# Patient Record
Sex: MALE | Race: WHITE | NOT HISPANIC OR LATINO | Employment: FULL TIME | URBAN - METROPOLITAN AREA
[De-identification: names, ages, dates, MRNs, and addresses within clinical notes are randomized per-mention and may not be internally consistent; named-entity substitution may affect disease eponyms.]

---

## 2017-03-23 ENCOUNTER — TRANSFERRED RECORDS (OUTPATIENT)
Dept: HEALTH INFORMATION MANAGEMENT | Facility: CLINIC | Age: 62
End: 2017-03-23

## 2018-03-15 ENCOUNTER — TRANSFERRED RECORDS (OUTPATIENT)
Dept: HEALTH INFORMATION MANAGEMENT | Facility: CLINIC | Age: 63
End: 2018-03-15

## 2018-07-26 ENCOUNTER — TRANSFERRED RECORDS (OUTPATIENT)
Dept: HEALTH INFORMATION MANAGEMENT | Facility: CLINIC | Age: 63
End: 2018-07-26

## 2018-08-02 ENCOUNTER — TRANSFERRED RECORDS (OUTPATIENT)
Dept: HEALTH INFORMATION MANAGEMENT | Facility: CLINIC | Age: 63
End: 2018-08-02

## 2018-08-03 ENCOUNTER — TRANSFERRED RECORDS (OUTPATIENT)
Dept: HEALTH INFORMATION MANAGEMENT | Facility: CLINIC | Age: 63
End: 2018-08-03

## 2018-09-06 ENCOUNTER — TRANSFERRED RECORDS (OUTPATIENT)
Dept: HEALTH INFORMATION MANAGEMENT | Facility: CLINIC | Age: 63
End: 2018-09-06

## 2018-09-12 ENCOUNTER — TRANSFERRED RECORDS (OUTPATIENT)
Dept: HEALTH INFORMATION MANAGEMENT | Facility: CLINIC | Age: 63
End: 2018-09-12

## 2018-09-18 ENCOUNTER — TRANSFERRED RECORDS (OUTPATIENT)
Dept: HEALTH INFORMATION MANAGEMENT | Facility: CLINIC | Age: 63
End: 2018-09-18

## 2019-01-31 ENCOUNTER — TRANSFERRED RECORDS (OUTPATIENT)
Dept: HEALTH INFORMATION MANAGEMENT | Facility: CLINIC | Age: 64
End: 2019-01-31

## 2019-08-16 ENCOUNTER — OFFICE VISIT (OUTPATIENT)
Dept: FAMILY MEDICINE | Facility: CLINIC | Age: 64
End: 2019-08-16
Payer: COMMERCIAL

## 2019-08-16 VITALS
HEART RATE: 66 BPM | SYSTOLIC BLOOD PRESSURE: 132 MMHG | RESPIRATION RATE: 18 BRPM | BODY MASS INDEX: 24.23 KG/M2 | OXYGEN SATURATION: 98 % | HEIGHT: 67 IN | TEMPERATURE: 98.5 F | DIASTOLIC BLOOD PRESSURE: 80 MMHG | WEIGHT: 154.4 LBS

## 2019-08-16 DIAGNOSIS — N40.0 BENIGN PROSTATIC HYPERPLASIA WITHOUT LOWER URINARY TRACT SYMPTOMS: ICD-10-CM

## 2019-08-16 DIAGNOSIS — Z00.00 ENCOUNTER FOR PREVENTIVE HEALTH EXAMINATION: Primary | ICD-10-CM

## 2019-08-16 DIAGNOSIS — Z12.83 SKIN CANCER SCREENING: ICD-10-CM

## 2019-08-16 PROCEDURE — 99397 PER PM REEVAL EST PAT 65+ YR: CPT | Performed by: PHYSICIAN ASSISTANT

## 2019-08-16 RX ORDER — FINASTERIDE 5 MG/1
5 TABLET, FILM COATED ORAL DAILY
COMMUNITY
End: 2020-02-04

## 2019-08-16 RX ORDER — TAMSULOSIN HYDROCHLORIDE 0.4 MG/1
0.4 CAPSULE ORAL DAILY
COMMUNITY
End: 2020-02-04

## 2019-08-16 RX ORDER — TAMSULOSIN HYDROCHLORIDE 0.4 MG/1
0.4 CAPSULE ORAL DAILY
Qty: 90 CAPSULE | Refills: 3 | Status: SHIPPED | OUTPATIENT
Start: 2019-08-16 | End: 2020-08-12

## 2019-08-16 RX ORDER — FINASTERIDE 5 MG/1
5 TABLET, FILM COATED ORAL DAILY
Qty: 90 TABLET | Refills: 3 | Status: SHIPPED | OUTPATIENT
Start: 2019-08-16 | End: 2020-08-27

## 2019-08-16 ASSESSMENT — ACTIVITIES OF DAILY LIVING (ADL): CURRENT_FUNCTION: NO ASSISTANCE NEEDED

## 2019-08-16 ASSESSMENT — ENCOUNTER SYMPTOMS
SORE THROAT: 0
CHILLS: 0
DIARRHEA: 0
ABDOMINAL PAIN: 0
MYALGIAS: 0
JOINT SWELLING: 0
HEADACHES: 0
HEMATURIA: 0
NERVOUS/ANXIOUS: 0
HEMATOCHEZIA: 0
WEAKNESS: 0
NAUSEA: 0
SHORTNESS OF BREATH: 0
PARESTHESIAS: 0
PALPITATIONS: 0
ARTHRALGIAS: 0
DIZZINESS: 0
CONSTIPATION: 0
EYE PAIN: 0
HEARTBURN: 0
FEVER: 0
DYSURIA: 0
FREQUENCY: 0
COUGH: 0

## 2019-08-16 ASSESSMENT — MIFFLIN-ST. JEOR: SCORE: 1438.98

## 2019-08-16 NOTE — PATIENT INSTRUCTIONS
Please send over records from your outside health care facility      Patient Education   Personalized Prevention Plan  You are due for the preventive services outlined below.  Your care team is available to assist you in scheduling these services.  If you have already completed any of these items, please share that information with your care team to update in your medical record.  Health Maintenance Due   Topic Date Due     Hepatitis C Screening  1955     Discuss Advance Care Planning  1955     Colonscopy  06/26/1963     Diptheria Tetanus Pertussis (DTAP/TDAP/TD) Vaccine (1 - Tdap) 06/26/1978     Cholesterol Lab  06/26/1988     Zoster (Shingles) Vaccine (1 of 2) 06/26/2003     Annual Wellness Visit  06/26/2018     FALL RISK ASSESSMENT  06/26/2018     Pneumococcal Vaccine (1 of 2 - PCV13) 06/26/2018     AORTIC ANEURYSM SCREENING (SYSTEM ASSIGNED)  06/26/2018     PHQ-2  01/01/2019

## 2019-08-16 NOTE — PROGRESS NOTES
"SUBJECTIVE:   Guerrero Ibrahim is a 64 year old male who presents for Preventive Visit.  Are you in the first 12 months of your Medicare coverage?  No    Healthy Habits:     In general, how would you rate your overall health?  Good    Frequency of exercise:  2-3 days/week    Duration of exercise:  15-30 minutes    Do you usually eat at least 4 servings of fruit and vegetables a day, include whole grains    & fiber and avoid regularly eating high fat or \"junk\" foods?  Yes    Taking medications regularly:  Yes    Medication side effects:  Not applicable    Ability to successfully perform activities of daily living:  No assistance needed    Home Safety:  No safety concerns identified    Hearing Impairment:  No hearing concerns    In the past 6 months, have you been bothered by leaking of urine?  No    In general, how would you rate your overall mental or emotional health?  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 Patient has family in the area and has lived in the [facility] previously. Has two daughters, also two granddaughters. Patient has history of LISA on CPAP since 1993. Had episode of urinary retention 13 months ago requiring catheterization in the emergency department. Since that time has been on finasteride and flomax without issue.     PMH:  Sleep apnea on CPAP  BPH  Appendectomy    PFHx:   None    Social:  Non-smoker      Do you feel safe in your environment? Yes    Do you have a Health Care Directive? Yes: Patient states has Advance Directive and will bring in a copy to clinic.      Fall risk     click delete button to remove this line now  Cognitive Screening   1) Repeat 3 items (Leader, Season, Table)    2) Clock draw:   3) 3 item recall: Recalls 3 objects  Results: 3 items recalled: COGNITIVE IMPAIRMENT LESS LIKELY    Mini-CogTM Copyright S Eliud. Licensed by the author for use in " [facility]; reprinted with permission ([email or web address]@[facility]). All rights reserved.      Do you have sleep apnea, excessive snoring or daytime drowsiness?: yes    Reviewed and updated as needed this visit by clinical staff  Tobacco  Med Hx  Surg Hx  Fam Hx  Soc Hx        Reviewed and updated as needed this visit by Provider        Social History     Tobacco Use     Smoking status: Never Smoker     Smokeless tobacco: Never Used   Substance Use Topics     Alcohol use: Yes     If you drink alcohol do you typically have >3 drinks per day or >7 drinks per week? No    Alcohol Use 8/16/2019   Prescreen: >3 drinks/day or >7 drinks/week? No   Prescreen: >3 drinks/day or >7 drinks/week? -       No care team member to display    The following health maintenance items are reviewed in Epic and correct as of today:  Health Maintenance   Topic Date Due     HEPATITIS C SCREENING  1955     ADVANCE CARE PLANNING  1955     COLONOSCOPY  06/26/1963     DTAP/TDAP/TD IMMUNIZATION (1 - Tdap) 06/26/1978     LIPID  06/26/1988     ZOSTER IMMUNIZATION (1 of 2) 06/26/2003     MEDICARE ANNUAL WELLNESS VISIT  06/26/2018     FALL RISK ASSESSMENT  06/26/2018     PNEUMOCOCCAL IMMUNIZATION 65+ LOW/MEDIUM RISK (1 of 2 - PCV13) 06/26/2018     AORTIC ANEURYSM SCREENING (SYSTEM ASSIGNED)  06/26/2018     PHQ-2  01/01/2019     INFLUENZA VACCINE (1) 09/01/2019     IPV IMMUNIZATION  Aged Out     MENINGITIS IMMUNIZATION  Aged Out     BP Readings from Last 3 Encounters:   08/16/19 132/80    Wt Readings from Last 3 Encounters:   08/16/19 70 kg (154 lb 6.4 oz)                  There is no problem list on file for this patient.    History reviewed. No pertinent surgical history.    Social History     Tobacco Use     Smoking status: Never Smoker     Smokeless tobacco: Never Used   Substance Use Topics     Alcohol use: Yes     History reviewed. No pertinent family history.      Current Outpatient Medications   Medication Sig Dispense Refill      "finasteride (PROSCAR) 5 MG tablet Take 5 mg by mouth daily       finasteride (PROSCAR) 5 MG tablet Take 1 tablet (5 mg) by mouth daily 90 tablet 3     tamsulosin (FLOMAX) 0.4 MG capsule Take 0.4 mg by mouth daily       tamsulosin (FLOMAX) 0.4 MG capsule Take 1 capsule (0.4 mg) by mouth daily 90 capsule 3       Review of Systems   Constitutional: Negative for chills and fever.   HENT: Negative for congestion, ear pain, hearing loss and sore throat.    Eyes: Negative for pain and visual disturbance.   Respiratory: Negative for cough and shortness of breath.    Cardiovascular: Negative for chest pain, palpitations and peripheral edema.   Gastrointestinal: Negative for abdominal pain, constipation, diarrhea, heartburn, hematochezia and nausea.   Genitourinary: Negative for discharge, dysuria, frequency, genital sores, hematuria, impotence and urgency.   Musculoskeletal: Negative for arthralgias, joint swelling and myalgias.   Skin: Negative for rash.   Neurological: Negative for dizziness, weakness, headaches and paresthesias.   Psychiatric/Behavioral: Negative for mood changes. The patient is not nervous/anxious.        OBJECTIVE:   /80 (BP Location: Right arm, Patient Position: Sitting, Cuff Size: Adult Regular)   Pulse 66   Temp 98.5  F (36.9  C) (Oral)   Resp 18   Ht 1.702 m (5' 7\")   Wt 70 kg (154 lb 6.4 oz)   SpO2 98%   BMI 24.18 kg/m   Estimated body mass index is 24.18 kg/m  as calculated from the following:    Height as of this encounter: 1.702 m (5' 7\").    Weight as of this encounter: 70 kg (154 lb 6.4 oz).  Physical Exam  GENERAL: healthy, alert and no distress  EYES: Eyes grossly normal to inspection, PERRL and conjunctivae and sclerae normal  HENT: ear canals and TM's normal, nose and mouth without ulcers or lesions  NECK: no adenopathy, no asymmetry, masses, or scars and thyroid normal to palpation  RESP: lungs clear to auscultation - no rales, rhonchi or wheezes  CV: regular rate and rhythm, " "normal S1 S2, no S3 or S4, no murmur, click or rub, no peripheral edema and peripheral pulses strong  ABDOMEN: soft, nontender, no hepatosplenomegaly, no masses and bowel sounds normal  MS: no gross musculoskeletal defects noted, no edema  SKIN: no suspicious lesions or rashes  NEURO: Normal strength and tone, mentation intact and speech normal  PSYCH: mentation appears normal, affect normal/bright    Diagnostic Test Results:  none     ASSESSMENT / PLAN:   1. Encounter for preventive health examination  - Establishing care after move from [place]  - No new health concerns at this time  - Reports he is up to date on preventive screenings such as colonoscopy, will send records from outside healthcare organization for verification. Reports colonoscopy screening [id number], had one just prior to move which was clear per his report     2. Benign prostatic hyperplasia without lower urinary tract symptoms  - finasteride (PROSCAR) 5 MG tablet; Take 1 tablet (5 mg) by mouth daily  Dispense: 90 tablet; Refill: 3  - tamsulosin (FLOMAX) 0.4 MG capsule; Take 1 capsule (0.4 mg) by mouth daily  Dispense: 90 capsule; Refill: 3  - Symptoms well managed on current medications without adverse effects    3. Skin cancer screening  - Has history of sunburn with multiple episodes of blistering  - Would like to establish with dermatology provider  - DERMATOLOGY REFERRAL      COUNSELING:  Reviewed preventive health counseling, as reflected in patient instructions       Regular exercise       Healthy diet/nutrition       Bladder control       Colon cancer screening    Estimated body mass index is 24.18 kg/m  as calculated from the following:    Height as of this encounter: 1.702 m (5' 7\").    Weight as of this encounter: 70 kg (154 lb 6.4 oz).         reports that he has never smoked. He has never used smokeless tobacco.      Appropriate preventive services were discussed with this patient, including applicable screening as appropriate for " cardiovascular disease, diabetes, osteopenia/osteoporosis, and glaucoma.  As appropriate for age/gender, discussed screening for colorectal cancer, prostate cancer, breast cancer, and cervical cancer. Checklist reviewing preventive services available has been given to the patient.    Reviewed patients plan of care and provided an AVS. The Basic Care Plan (routine screening as documented in Health Maintenance) for Guerrero meets the Care Plan requirement. This Care Plan has been established and reviewed with the Patient.    Counseling Resources:  ATP IV Guidelines  Pooled Cohorts Equation Calculator  Breast Cancer Risk Calculator  FRAX Risk Assessment  ICSI Preventive Guidelines  Dietary Guidelines for Americans, 2010  USDA's MyPlate  ASA Prophylaxis  Lung CA Screening    Nagi Ayoub PA-C  [facility]    Identified Health Risks:

## 2019-08-22 ENCOUNTER — OFFICE VISIT (OUTPATIENT)
Dept: DERMATOLOGY | Facility: CLINIC | Age: 64
End: 2019-08-22
Attending: PHYSICIAN ASSISTANT
Payer: COMMERCIAL

## 2019-08-22 ENCOUNTER — DOCUMENTATION ONLY (OUTPATIENT)
Dept: CARE COORDINATION | Facility: CLINIC | Age: 64
End: 2019-08-22

## 2019-08-22 DIAGNOSIS — Z12.83 SKIN CANCER SCREENING: ICD-10-CM

## 2019-08-22 DIAGNOSIS — L57.8 DIFFUSE PHOTODAMAGE OF SKIN: ICD-10-CM

## 2019-08-22 DIAGNOSIS — L81.4 SOLAR LENTIGINOSIS: Primary | ICD-10-CM

## 2019-08-22 RX ORDER — CHOLECALCIFEROL (VITAMIN D3) 50 MCG
1 TABLET ORAL DAILY
COMMUNITY

## 2019-08-22 ASSESSMENT — PAIN SCALES - GENERAL: PAINLEVEL: NO PAIN (0)

## 2019-08-22 NOTE — LETTER
8/22/2019       RE: Guerrero Ibrahim  [address] [place] [address]     Dear Colleague,    Thank you for referring your patient, Guerrero Ibrahim, to the [facility] at [facility]. Please see a copy of my visit note below.    CHIEF COMPLAINT:  Full-body skin check.      HISTORY OF PRESENT ILLNESS:  Mr. 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  He has previously lived in the [place] several decades ago as well.  Today he presents for a full-body skin exam.  He has no personal history of skin cancer and no known family history of melanoma.  He denies any new or changing moles and has no obvious localized areas of itch, pain or bleeding.  His specific concerns are several tan spots on his back as well as a small crusted spot on his left forehead, which he thinks is from a scratch or bump in the last 1 week.      REVIEW OF SYSTEMS:  No recent fevers or chills.  No nonhealing oral sores.      PHYSICAL EXAMINATION:   GENERAL:  This is a well-appearing, well-nourished male with a normal mood and affect who is oriented x3.   SKIN:  A cutaneous exam of the head, neck, chest, abdomen, back, bilateral upper and lower extremities and buttocks was performed.  On the left forehead immediately above the lateral aspect of the eyebrow, there is a 2 mm crusted pink papule with erosion.  On the bilateral cheeks, there are scattered 1 mm light tan macules consistent with solar lentigines.  On the back, there are rare scattered 1-2 mm pigmented, flat-topped papules consistent with benign nevi.  On the cheeks, neck and forearms, there is moderate chronic photodamage and rhytids.  The rest of his skin exam demonstrates scattered, benign-appearing solar lentigines but no lesions concerning for melanoma or nonmelanoma skin cancer.       ASSESSMENT AND PLAN:   1.  Crusted erosion of the left forehead.  This appears to be traumatic in nature today.  No significant concerning features were identified on physical exam or dermoscopy.  Reassurance was provided, and gentle skin care was encouraged.   2.  Scattered solar lentigines and benign-appearing nevi.  Reassurance was provided as to the benign nature of these lesions.   3.  Mild to moderate chronic photodamage.  We had an extended discussion today about the importance of high SPF sunscreen and sun avoidance and the ABCDEs of melanoma as well as the presenting signs and symptoms of nonmelanoma skin cancers.  Reassurance was provided as to the benign nature of his exam today.   4.  He will follow up in our clinic in 1-2 years' time.       Rajinder Pulido MD  Dermatology Attending

## 2019-08-22 NOTE — NURSING NOTE
Dermatology Rooming Note    Guerrero Ibrahim's goals for this visit include:   Chief Complaint   Patient presents with     Skin Check     Skin check, no lesions of concern noted.     Roseanna Tellez LPN

## 2019-08-22 NOTE — PROGRESS NOTES
CHIEF COMPLAINT:  Full-body skin check.      HISTORY OF PRESENT ILLNESS:  Mr. 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  He has previously lived in the [place] several decades ago as well.  Today he presents for a full-body skin exam.  He has no personal history of skin cancer and no known family history of melanoma.  He denies any new or changing moles and has no obvious localized areas of itch, pain or bleeding.  His specific concerns are several tan spots on his back as well as a small crusted spot on his left forehead, which he thinks is from a scratch or bump in the last 1 week.      REVIEW OF SYSTEMS:  No recent fevers or chills.  No nonhealing oral sores.      PHYSICAL EXAMINATION:   GENERAL:  This is a well-appearing, well-nourished male with a normal mood and affect who is oriented x3.   SKIN:  A cutaneous exam of the head, neck, chest, abdomen, back, bilateral upper and lower extremities and buttocks was performed.  On the left forehead immediately above the lateral aspect of the eyebrow, there is a 2 mm crusted pink papule with erosion.  On the bilateral cheeks, there are scattered 1 mm light tan macules consistent with solar lentigines.  On the back, there are rare scattered 1-2 mm pigmented, flat-topped papules consistent with benign nevi.  On the cheeks, neck and forearms, there is moderate chronic photodamage and rhytids.  The rest of his skin exam demonstrates scattered, benign-appearing solar lentigines but no lesions concerning for melanoma or nonmelanoma skin cancer.      ASSESSMENT AND PLAN:   1.  Crusted erosion of the left forehead.  This appears to be traumatic in nature today.  No significant concerning features were identified on physical exam or dermoscopy.  Reassurance was provided, and gentle skin care was encouraged.   2.  Scattered  solar lentigines and benign-appearing nevi.  Reassurance was provided as to the benign nature of these lesions.   3.  Mild to moderate chronic photodamage.  We had an extended discussion today about the importance of high SPF sunscreen and sun avoidance and the ABCDEs of melanoma as well as the presenting signs and symptoms of nonmelanoma skin cancers.  Reassurance was provided as to the benign nature of his exam today.   4.  He will follow up in our clinic in 1-2 years' time.       Rajinder Pulido MD  Dermatology Attending

## 2019-08-28 ENCOUNTER — DOCUMENTATION ONLY (OUTPATIENT)
Dept: FAMILY MEDICINE | Facility: CLINIC | Age: 64
End: 2019-08-28

## 2019-08-28 NOTE — PROGRESS NOTES
Received outside records from [facility]. Placed in Nagi Ayoub's mailbox.

## 2019-09-14 PROBLEM — L57.8 DIFFUSE PHOTODAMAGE OF SKIN: Status: ACTIVE | Noted: 2019-09-14

## 2019-09-14 PROBLEM — L81.4 SOLAR LENTIGINOSIS: Status: ACTIVE | Noted: 2019-09-14

## 2019-09-14 PROBLEM — Z12.83 SKIN CANCER SCREENING: Status: ACTIVE | Noted: 2019-09-14

## 2019-10-31 ENCOUNTER — ANCILLARY PROCEDURE (OUTPATIENT)
Dept: GENERAL RADIOLOGY | Facility: CLINIC | Age: 64
End: 2019-10-31
Attending: PHYSICIAN ASSISTANT
Payer: COMMERCIAL

## 2019-10-31 ENCOUNTER — OFFICE VISIT (OUTPATIENT)
Dept: URGENT CARE | Facility: URGENT CARE | Age: 64
End: 2019-10-31
Payer: COMMERCIAL

## 2019-10-31 VITALS
TEMPERATURE: 97.5 F | HEART RATE: 71 BPM | RESPIRATION RATE: 20 BRPM | SYSTOLIC BLOOD PRESSURE: 188 MMHG | DIASTOLIC BLOOD PRESSURE: 87 MMHG | OXYGEN SATURATION: 99 %

## 2019-10-31 DIAGNOSIS — M54.6 ACUTE LEFT-SIDED THORACIC BACK PAIN: ICD-10-CM

## 2019-10-31 DIAGNOSIS — R07.9 ACUTE CHEST PAIN: Primary | ICD-10-CM

## 2019-10-31 DIAGNOSIS — R07.9 ACUTE CHEST PAIN: ICD-10-CM

## 2019-10-31 LAB
ALBUMIN SERPL-MCNC: 4.3 G/DL (ref 3.4–5)
ALP SERPL-CCNC: 85 U/L (ref 40–150)
ALT SERPL W P-5'-P-CCNC: 22 U/L (ref 0–70)
ANION GAP SERPL CALCULATED.3IONS-SCNC: 6 MMOL/L (ref 3–14)
AST SERPL W P-5'-P-CCNC: 11 U/L (ref 0–45)
BASOPHILS # BLD AUTO: 0 10E9/L (ref 0–0.2)
BASOPHILS NFR BLD AUTO: 0.6 %
BILIRUB SERPL-MCNC: 0.5 MG/DL (ref 0.2–1.3)
BUN SERPL-MCNC: 16 MG/DL (ref 7–30)
CALCIUM SERPL-MCNC: 8.8 MG/DL (ref 8.5–10.1)
CHLORIDE SERPL-SCNC: 104 MMOL/L (ref 94–109)
CO2 SERPL-SCNC: 28 MMOL/L (ref 20–32)
CREAT SERPL-MCNC: 0.91 MG/DL (ref 0.66–1.25)
D DIMER PPP FEU-MCNC: 0.3 UG/ML FEU (ref 0–0.5)
DIFFERENTIAL METHOD BLD: NORMAL
EOSINOPHIL # BLD AUTO: 0.3 10E9/L (ref 0–0.7)
EOSINOPHIL NFR BLD AUTO: 3.9 %
ERYTHROCYTE [DISTWIDTH] IN BLOOD BY AUTOMATED COUNT: 12.2 % (ref 10–15)
ERYTHROCYTE [SEDIMENTATION RATE] IN BLOOD BY WESTERGREN METHOD: 7 MM/H (ref 0–20)
GFR SERPL CREATININE-BSD FRML MDRD: 88 ML/MIN/{1.73_M2}
GLUCOSE SERPL-MCNC: 100 MG/DL (ref 70–99)
HCT VFR BLD AUTO: 40.6 % (ref 40–53)
HGB BLD-MCNC: 13.5 G/DL (ref 13.3–17.7)
LYMPHOCYTES # BLD AUTO: 1.7 10E9/L (ref 0.8–5.3)
LYMPHOCYTES NFR BLD AUTO: 25.2 %
MCH RBC QN AUTO: 30.1 PG (ref 26.5–33)
MCHC RBC AUTO-ENTMCNC: 33.3 G/DL (ref 31.5–36.5)
MCV RBC AUTO: 91 FL (ref 78–100)
MONOCYTES # BLD AUTO: 0.6 10E9/L (ref 0–1.3)
MONOCYTES NFR BLD AUTO: 8.5 %
NEUTROPHILS # BLD AUTO: 4.1 10E9/L (ref 1.6–8.3)
NEUTROPHILS NFR BLD AUTO: 61.8 %
PLATELET # BLD AUTO: 189 10E9/L (ref 150–450)
POTASSIUM SERPL-SCNC: 4.2 MMOL/L (ref 3.4–5.3)
PROT SERPL-MCNC: 7.6 G/DL (ref 6.8–8.8)
RBC # BLD AUTO: 4.48 10E12/L (ref 4.4–5.9)
SODIUM SERPL-SCNC: 138 MMOL/L (ref 133–144)
TROPONIN I SERPL-MCNC: <0.015 UG/L (ref 0–0.04)
WBC # BLD AUTO: 6.6 10E9/L (ref 4–11)

## 2019-10-31 PROCEDURE — 85652 RBC SED RATE AUTOMATED: CPT | Performed by: PHYSICIAN ASSISTANT

## 2019-10-31 PROCEDURE — 80053 COMPREHEN METABOLIC PANEL: CPT | Performed by: PHYSICIAN ASSISTANT

## 2019-10-31 PROCEDURE — 93000 ELECTROCARDIOGRAM COMPLETE: CPT | Performed by: PHYSICIAN ASSISTANT

## 2019-10-31 PROCEDURE — 99215 OFFICE O/P EST HI 40 MIN: CPT | Mod: 25 | Performed by: PHYSICIAN ASSISTANT

## 2019-10-31 PROCEDURE — 84484 ASSAY OF TROPONIN QUANT: CPT | Performed by: PHYSICIAN ASSISTANT

## 2019-10-31 PROCEDURE — 71046 X-RAY EXAM CHEST 2 VIEWS: CPT

## 2019-10-31 PROCEDURE — 85379 FIBRIN DEGRADATION QUANT: CPT | Performed by: PHYSICIAN ASSISTANT

## 2019-10-31 PROCEDURE — 85025 COMPLETE CBC W/AUTO DIFF WBC: CPT | Performed by: PHYSICIAN ASSISTANT

## 2019-10-31 PROCEDURE — 36415 COLL VENOUS BLD VENIPUNCTURE: CPT | Performed by: PHYSICIAN ASSISTANT

## 2019-10-31 PROCEDURE — 96372 THER/PROPH/DIAG INJ SC/IM: CPT | Performed by: PHYSICIAN ASSISTANT

## 2019-10-31 RX ORDER — KETOROLAC TROMETHAMINE 30 MG/ML
30 INJECTION, SOLUTION INTRAMUSCULAR; INTRAVENOUS ONCE
Status: COMPLETED | OUTPATIENT
Start: 2019-10-31 | End: 2019-10-31

## 2019-10-31 RX ORDER — METHYLPREDNISOLONE 4 MG
TABLET, DOSE PACK ORAL
Qty: 21 TABLET | Refills: 0 | Status: SHIPPED | OUTPATIENT
Start: 2019-10-31 | End: 2020-02-04

## 2019-10-31 RX ORDER — METHOCARBAMOL 750 MG/1
750 TABLET, FILM COATED ORAL 4 TIMES DAILY PRN
Qty: 30 TABLET | Refills: 0 | Status: SHIPPED | OUTPATIENT
Start: 2019-10-31 | End: 2020-02-04

## 2019-10-31 RX ADMIN — KETOROLAC TROMETHAMINE 30 MG: 30 INJECTION, SOLUTION INTRAMUSCULAR; INTRAVENOUS at 11:27

## 2019-11-01 NOTE — PROGRESS NOTES
SUBJECTIVE:   Guerrero Ibrahim is a 64 year old male presenting with a chief complaint of having left sided back pain that wraps around to the left side chest area.  Onset of symptoms was 2 day(s) ago.  Course of illness is same.    Severity moderate  Current and Associated symptoms: pain in upper left back area  Treatment measures tried include OTC medications.  Predisposing factors include none.    PMH  Allergies     Allergies   Allergen Reactions     Percocet [Oxycodone-Acetaminophen] Nausea and Vomiting     Family Hx  Allergies  HTN    Social History     Tobacco Use     Smoking status: Never Smoker     Smokeless tobacco: Never Used   Substance Use Topics     Alcohol use: Yes       ROS:  CONSTITUTIONAL:NEGATIVE for fever, chills, change in weight  INTEGUMENTARY/SKIN: NEGATIVE for worrisome rashes, moles or lesions  EYES: NEGATIVE for vision changes or irritation  ENT/MOUTH: NEGATIVE for ear, mouth and throat problems  RESP:NEGATIVE for significant cough or SOB  CV: POSITIVE for left side chest pain  GI: NEGATIVE for nausea, abdominal pain, heartburn, or change in bowel habits  : negative for and dysuria  MUSCULOSKELETAL: POSITIVE  for left upper back tenderness pain   VASC: NEGATIVE for cool extremities  NEURO: NEGATIVE for weakness, dizziness or paresthesias    OBJECTIVE  :BP (!) 188/87   Pulse 71   Temp 97.5  F (36.4  C) (Oral)   Resp 20   SpO2 99%   GENERAL APPEARANCE: healthy, alert and no distress  EYES: EOMI,  PERRL, conjunctiva clear  HENT: ear canals and TM's normal.  Nose and mouth without ulcers, erythema or lesions  NECK: supple, nontender, no lymphadenopathy  RESP: lungs clear to auscultation - no rales, rhonchi or wheezes  CV: regular rates and rhythm, normal S1 S2, no murmur noted  ABDOMEN:  soft, nontender, no HSM or masses and bowel sounds normal  Extremities: Positive for full ROM of upper extremities  MS: Positive for left upper back tenderness, pain  NEURO: Normal strength and tone, sensory  exam grossly normal,  normal speech and mentation  SKIN: no suspicious lesions or rashes    Chest xray Negative for acute findings, read by Anand MARIE at time of visit.    Results for orders placed or performed in visit on 10/31/19   Troponin I     Status: None   Result Value Ref Range    Troponin I ES <0.015 0.000 - 0.045 ug/L   Erythrocyte sedimentation rate auto     Status: None   Result Value Ref Range    Sed Rate 7 0 - 20 mm/h   D dimer quantitative     Status: None   Result Value Ref Range    D Dimer 0.3 0.0 - 0.50 ug/ml FEU   CBC with platelets differential     Status: None   Result Value Ref Range    WBC 6.6 4.0 - 11.0 10e9/L    RBC Count 4.48 4.4 - 5.9 10e12/L    Hemoglobin 13.5 13.3 - 17.7 g/dL    Hematocrit 40.6 40.0 - 53.0 %    MCV 91 78 - 100 fl    MCH 30.1 26.5 - 33.0 pg    MCHC 33.3 31.5 - 36.5 g/dL    RDW 12.2 10.0 - 15.0 %    Platelet Count 189 150 - 450 10e9/L    % Neutrophils 61.8 %    % Lymphocytes 25.2 %    % Monocytes 8.5 %    % Eosinophils 3.9 %    % Basophils 0.6 %    Absolute Neutrophil 4.1 1.6 - 8.3 10e9/L    Absolute Lymphocytes 1.7 0.8 - 5.3 10e9/L    Absolute Monocytes 0.6 0.0 - 1.3 10e9/L    Absolute Eosinophils 0.3 0.0 - 0.7 10e9/L    Absolute Basophils 0.0 0.0 - 0.2 10e9/L    Diff Method Automated Method    Comprehensive metabolic panel     Status: Abnormal   Result Value Ref Range    Sodium 138 133 - 144 mmol/L    Potassium 4.2 3.4 - 5.3 mmol/L    Chloride 104 94 - 109 mmol/L    Carbon Dioxide 28 20 - 32 mmol/L    Anion Gap 6 3 - 14 mmol/L    Glucose 100 (H) 70 - 99 mg/dL    Urea Nitrogen 16 7 - 30 mg/dL    Creatinine 0.91 0.66 - 1.25 mg/dL    GFR Estimate 88 >60 mL/min/[1.73_m2]    GFR Estimate If Black >90 >60 mL/min/[1.73_m2]    Calcium 8.8 8.5 - 10.1 mg/dL    Bilirubin Total 0.5 0.2 - 1.3 mg/dL    Albumin 4.3 3.4 - 5.0 g/dL    Protein Total 7.6 6.8 - 8.8 g/dL    Alkaline Phosphatase 85 40 - 150 U/L    ALT 22 0 - 70 U/L    AST 11 0 - 45 U/L       ASSESSMENT/PLAN       ICD-10-CM    1. Acute chest pain R07.9 EKG 12-lead complete w/read - Clinics     Troponin I     Erythrocyte sedimentation rate auto     D dimer quantitative     CBC with platelets differential     Comprehensive metabolic panel     XR Chest 2 Views     ketorolac (TORADOL) injection 30 mg   2. Acute left-sided thoracic back pain M54.6 Troponin I     Erythrocyte sedimentation rate auto     D dimer quantitative     CBC with platelets differential     Comprehensive metabolic panel     XR Chest 2 Views     ketorolac (TORADOL) injection 30 mg     methylPREDNISolone (MEDROL DOSEPAK) 4 MG tablet therapy pack     methocarbamol (ROBAXIN) 750 MG tablet       Orders Placed This Encounter     XR Chest 2 Views     Troponin I     Erythrocyte sedimentation rate auto     D dimer quantitative     CBC with platelets differential     Comprehensive metabolic panel     ketorolac (TORADOL) injection 30 mg     methylPREDNISolone (MEDROL DOSEPAK) 4 MG tablet therapy pack     methocarbamol (ROBAXIN) 750 MG tablet     Chest xray to rule out pneumothorax  CBC to evaluate for anemia  CMP to evaluate kidney function   D-dimer to rule out PE  Troponin and EKG to rule out cardiac problems  Patient given toradol shot for pain  Medrol and Robaxin for muscle tenderness, pain

## 2020-02-04 ENCOUNTER — DOCUMENTATION ONLY (OUTPATIENT)
Dept: CARE COORDINATION | Facility: CLINIC | Age: 65
End: 2020-02-04

## 2020-02-04 ENCOUNTER — OFFICE VISIT (OUTPATIENT)
Dept: DERMATOLOGY | Facility: CLINIC | Age: 65
End: 2020-02-04
Payer: COMMERCIAL

## 2020-02-04 DIAGNOSIS — D48.9 NEOPLASM OF UNCERTAIN BEHAVIOR: Primary | ICD-10-CM

## 2020-02-04 DIAGNOSIS — R58 ECCHYMOSIS: ICD-10-CM

## 2020-02-04 ASSESSMENT — PAIN SCALES - GENERAL
PAINLEVEL: NO PAIN (0)
PAINLEVEL: NO PAIN (0)

## 2020-02-04 NOTE — LETTER
2/4/2020       RE: Guerrero Ibrahim  [address] [place] [address]     Dear Colleague,    Thank you for referring your patient, Guerrero Ibrahim, to the [facility] DERMATOLOGY at [facility]. Please see a copy of my visit note below.    [facility] Dermatology Note      Dermatology Problem List:  # Neoplasm of uncertain behavior, R superior shoulder, s/p bx 2/4/2020    Encounter Date: Feb 4, 2020    CC:  Chief Complaint   Patient presents with     Derm Problem     Spot check - Guerrero is concerned about spots on the left forearm and right shoulder         History of Present Illness:  Mr. Guerrero Ibrahim is a 64 year old male who presents today for two lesions of concern. He was last seen by Dr. Pulido on 8/22/2019 where he received a FBSE that demonstrated scattered, benign-appearing solar lentigines but no lesions concerning for melanoma or nonmelanoma skin cancer. He has two lesions of concern today. One is on his R shoulder, one is on his L forearm. They are both asymptomatic. The one on his left forearm has been there for 1 week. The one on his right shoulder has been there for about 1-2 months. No pain or bleeding for either. Patient reports he experienced a lot of sun exposure and repeated sun burns while growing up on the [facility]. Patient is otherwise feeling well, no additional skin concerns.    Past Medical History:   Patient Active Problem List   Diagnosis     Solar lentiginosis     Diffuse photodamage of skin     Skin cancer screening     BPH    Social History:  Patient reports that he has never smoked. He has never used smokeless tobacco. He reports current alcohol use. He reports that he does not use drugs.    Family History:  Family History   Problem Relation Age of Onset     Melanoma No family hx of        Medications:  Current Outpatient Medications   Medication Sig Dispense Refill     finasteride (PROSCAR) 5 MG tablet Take 1 tablet (5 mg) by mouth  daily 90 tablet 3     tamsulosin (FLOMAX) 0.4 MG capsule Take 1 capsule (0.4 mg) by mouth daily 90 capsule 3     vitamin D3 (CHOLECALCIFEROL) 2000 units (50 mcg) tablet Take 1 tablet by mouth daily         Allergies   Allergen Reactions     Percocet [Oxycodone-Acetaminophen] Nausea and Vomiting       Review of Systems:  -Skin Establ Pt: The patient denies any new rash, pruritus, or lesions that are symptomatic, changing or bleeding, except as per HPI.  -Constitutional: The patient is feeling generally well.    Physical exam:  GEN: This is a well developed, well-nourished male in no acute distress, in a pleasant mood.    SKIN: Focused examination of the back and shoulders was performed.  - Ward type: II  - R superior shoulder with light pink papule. Dermoscopy with some hairpin vessels.    - L forearm light pink macule with central ecchymosis.   - No other lesions of concern on areas examined.     Impression/Plan:  1. Neoplasm of uncertain behavior on the R superior shoulder. The differential diagnosis includes ISK v SCC v BCC. Discussed options of empiric cryo versus biopsy, ultimately we decided on biopsy.  - Shave biopsy:  After discussion of benefits and risks including but not limited to bleeding/bruising, pain/swelling, infection, scar, incomplete removal, nerve damage/numbness, recurrence, and non-diagnostic biopsy, written consent, verbal consent and photographs were obtained. Time-out was performed. The area was cleaned with isopropyl alcohol. 0.5ml of 1% lidocaine with 1:100,000 epinephrine was injected to obtain adequate anesthesia. A shave biopsy was performed. Hemostasis was achieved with aluminium chloride. Vaseline and a sterile dressing were applied. The patient tolerated the procedure and no complications were noted. The patient was provided with verbal and written post care instructions.    2. Ecchymosis, L forearm. Discussed anticipated time for resolution. Advised to let us know if it does  not resolve.   - No further intervention required. Patient to report changes.     Follow-up in August 2020 for next skin exam as planned, earlier for new or changing lesions.       Staff Involved:    Scribe Disclosure  I, Soni Griffin, am serving as a scribe to document services personally performed by Dr. Makayla Benitez MD, based on data collection and the provider's statements to me.     Provider Disclosure:   The documentation recorded by the scribe accurately reflects the services I personally performed and the decisions made by me.    Makayla Benitez MD    Department of Dermatology  [facility] [place]: Phone: [phone], Fax: [phone]

## 2020-02-05 NOTE — PROGRESS NOTES
[facility] Dermatology Note      Dermatology Problem List:  # Neoplasm of uncertain behavior, R superior shoulder, s/p bx 2/4/2020    Encounter Date: Feb 4, 2020    CC:  Chief Complaint   Patient presents with     Derm Problem     Spot check - Guerrero is concerned about spots on the left forearm and right shoulder         History of Present Illness:  Mr. Guerrero Ibrahim is a 64 year old male who presents today for two lesions of concern. He was last seen by Dr. Pulido on 8/22/2019 where he received a FBSE that demonstrated scattered, benign-appearing solar lentigines but no lesions concerning for melanoma or nonmelanoma skin cancer. He has two lesions of concern today. One is on his R shoulder, one is on his L forearm. They are both asymptomatic. The one on his left forearm has been there for 1 week. The one on his right shoulder has been there for about 1-2 months. No pain or bleeding for either. Patient reports he experienced a lot of sun exposure and repeated sun burns while growing up on the [facility]. Patient is otherwise feeling well, no additional skin concerns.    Past Medical History:   Patient Active Problem List   Diagnosis     Solar lentiginosis     Diffuse photodamage of skin     Skin cancer screening     BPH    Social History:  Patient reports that he has never smoked. He has never used smokeless tobacco. He reports current alcohol use. He reports that he does not use drugs.    Family History:  Family History   Problem Relation Age of Onset     Melanoma No family hx of        Medications:  Current Outpatient Medications   Medication Sig Dispense Refill     finasteride (PROSCAR) 5 MG tablet Take 1 tablet (5 mg) by mouth daily 90 tablet 3     tamsulosin (FLOMAX) 0.4 MG capsule Take 1 capsule (0.4 mg) by mouth daily 90 capsule 3     vitamin D3 (CHOLECALCIFEROL) 2000 units (50 mcg) tablet Take 1 tablet by mouth daily         Allergies   Allergen Reactions     Percocet  [Oxycodone-Acetaminophen] Nausea and Vomiting       Review of Systems:  -Skin Establ Pt: The patient denies any new rash, pruritus, or lesions that are symptomatic, changing or bleeding, except as per HPI.  -Constitutional: The patient is feeling generally well.    Physical exam:  GEN: This is a well developed, well-nourished male in no acute distress, in a pleasant mood.    SKIN: Focused examination of the back and shoulders was performed.  - Ward type: II  - R superior shoulder with light pink papule. Dermoscopy with some hairpin vessels.    - L forearm light pink macule with central ecchymosis.   - No other lesions of concern on areas examined.     Impression/Plan:  1. Neoplasm of uncertain behavior on the R superior shoulder. The differential diagnosis includes ISK v SCC v BCC. Discussed options of empiric cryo versus biopsy, ultimately we decided on biopsy.  - Shave biopsy:  After discussion of benefits and risks including but not limited to bleeding/bruising, pain/swelling, infection, scar, incomplete removal, nerve damage/numbness, recurrence, and non-diagnostic biopsy, written consent, verbal consent and photographs were obtained. Time-out was performed. The area was cleaned with isopropyl alcohol. 0.5ml of 1% lidocaine with 1:100,000 epinephrine was injected to obtain adequate anesthesia. A shave biopsy was performed. Hemostasis was achieved with aluminium chloride. Vaseline and a sterile dressing were applied. The patient tolerated the procedure and no complications were noted. The patient was provided with verbal and written post care instructions.    2. Ecchymosis, L forearm. Discussed anticipated time for resolution. Advised to let us know if it does not resolve.   - No further intervention required. Patient to report changes.     Follow-up in August 2020 for next skin exam as planned, earlier for new or changing lesions.       Staff Involved:    Scribe Disclosure  I, Soni Griffin, am serving  as a scribe to document services personally performed by Dr. Makayla Benitez MD, based on data collection and the provider's statements to me.     Provider Disclosure:   The documentation recorded by the scribe accurately reflects the services I personally performed and the decisions made by me.    Makayla Benitez MD    Department of Dermatology  [facility]: Phone: [phone], Fax: [phone]

## 2020-02-05 NOTE — NURSING NOTE
Lidocaine-epinephrine 1-1:847960 % injection   1mL once for one use, starting 2/4/2020 ending 2/4/2020,  2mL disp, R-0, injection  Injected by lEva Adams, EMT

## 2020-02-05 NOTE — NURSING NOTE
Dermatology Rooming Note    Guerrero Ibrahim's goals for this visit include:   Chief Complaint   Patient presents with     Derm Problem     Spot check - Guerrero is concerned about spots on the left forearm and right shoulder     Elva Adams, EMT

## 2020-02-10 LAB — COPATH REPORT: NORMAL

## 2020-03-04 ENCOUNTER — DOCUMENTATION ONLY (OUTPATIENT)
Dept: FAMILY MEDICINE | Facility: CLINIC | Age: 65
End: 2020-03-04

## 2020-03-04 NOTE — PROGRESS NOTES
Received outside records from [facility]. Placed in Nagi Ayoub's mailbox.

## 2020-08-08 DIAGNOSIS — N40.0 BENIGN PROSTATIC HYPERPLASIA WITHOUT LOWER URINARY TRACT SYMPTOMS: ICD-10-CM

## 2020-08-12 RX ORDER — TAMSULOSIN HYDROCHLORIDE 0.4 MG/1
0.4 CAPSULE ORAL DAILY
Qty: 30 CAPSULE | Refills: 0 | Status: SHIPPED | OUTPATIENT
Start: 2020-08-12 | End: 2020-08-27

## 2020-08-12 NOTE — TELEPHONE ENCOUNTER
Pt has appt on 8/27 to establish care with Dr Paredes as new PCP.   Tamsulosin refilled for 30 days so he does not run out    Delia Espinal, RN, BSN

## 2020-08-24 ASSESSMENT — ENCOUNTER SYMPTOMS
ARTHRALGIAS: 1
NECK PAIN: 0
BACK PAIN: 0
MYALGIAS: 0

## 2020-08-24 ASSESSMENT — ACTIVITIES OF DAILY LIVING (ADL)
IN_THE_PAST_7_DAYS,_DID_YOU_NEED_HELP_FROM_OTHERS_TO_PERFORM_EVERYDAY_ACTIVITIES_SUCH_AS_EATING,_GETTING_DRESSED,_GROOMING,_BATHING,_WALKING,_OR_USING_THE_TOILET: N
IN_THE_PAST_7_DAYS,_DID_YOU_NEED_HELP_FROM_OTHERS_TO_TAKE_CARE_OF_THINGS_SUCH_AS_LAUNDRY_AND_HOUSEKEEPING,_BANKING,_SHOPPING,_USING_THE_TELEPHONE,_FOOD_PREPARATION,_TRANSPORTATION,_OR_TAKING_YOUR_OWN_MEDICATIONS?: N

## 2020-08-27 ENCOUNTER — OFFICE VISIT (OUTPATIENT)
Dept: INTERNAL MEDICINE | Facility: CLINIC | Age: 65
End: 2020-08-27
Payer: COMMERCIAL

## 2020-08-27 VITALS
HEART RATE: 86 BPM | SYSTOLIC BLOOD PRESSURE: 148 MMHG | WEIGHT: 162 LBS | BODY MASS INDEX: 25.37 KG/M2 | DIASTOLIC BLOOD PRESSURE: 89 MMHG | OXYGEN SATURATION: 97 %

## 2020-08-27 DIAGNOSIS — G47.33 OSA (OBSTRUCTIVE SLEEP APNEA): ICD-10-CM

## 2020-08-27 DIAGNOSIS — R79.9 ABNORMAL FINDING OF BLOOD CHEMISTRY, UNSPECIFIED: ICD-10-CM

## 2020-08-27 DIAGNOSIS — Z12.5 ENCOUNTER FOR SCREENING FOR MALIGNANT NEOPLASM OF PROSTATE: ICD-10-CM

## 2020-08-27 DIAGNOSIS — Z13.220 SCREENING CHOLESTEROL LEVEL: ICD-10-CM

## 2020-08-27 DIAGNOSIS — N40.0 BENIGN PROSTATIC HYPERPLASIA WITHOUT LOWER URINARY TRACT SYMPTOMS: ICD-10-CM

## 2020-08-27 DIAGNOSIS — N40.0 BENIGN PROSTATIC HYPERPLASIA WITHOUT LOWER URINARY TRACT SYMPTOMS: Primary | ICD-10-CM

## 2020-08-27 LAB
ALBUMIN SERPL-MCNC: 4 G/DL (ref 3.4–5)
ALP SERPL-CCNC: 81 U/L (ref 40–150)
ALT SERPL W P-5'-P-CCNC: 22 U/L (ref 0–70)
ANION GAP SERPL CALCULATED.3IONS-SCNC: 4 MMOL/L (ref 3–14)
AST SERPL W P-5'-P-CCNC: 12 U/L (ref 0–45)
BASOPHILS # BLD AUTO: 0.1 10E9/L (ref 0–0.2)
BASOPHILS NFR BLD AUTO: 0.9 %
BILIRUB SERPL-MCNC: 0.5 MG/DL (ref 0.2–1.3)
BUN SERPL-MCNC: 16 MG/DL (ref 7–30)
CALCIUM SERPL-MCNC: 9 MG/DL (ref 8.5–10.1)
CHLORIDE SERPL-SCNC: 108 MMOL/L (ref 94–109)
CHOLEST SERPL-MCNC: 204 MG/DL
CO2 SERPL-SCNC: 27 MMOL/L (ref 20–32)
CREAT SERPL-MCNC: 0.92 MG/DL (ref 0.66–1.25)
DIFFERENTIAL METHOD BLD: NORMAL
EOSINOPHIL # BLD AUTO: 0.2 10E9/L (ref 0–0.7)
EOSINOPHIL NFR BLD AUTO: 3.1 %
ERYTHROCYTE [DISTWIDTH] IN BLOOD BY AUTOMATED COUNT: 11.9 % (ref 10–15)
GFR SERPL CREATININE-BSD FRML MDRD: 85 ML/MIN/{1.73_M2}
GLUCOSE SERPL-MCNC: 105 MG/DL (ref 70–99)
HCT VFR BLD AUTO: 41.7 % (ref 40–53)
HDLC SERPL-MCNC: 58 MG/DL
HGB BLD-MCNC: 13.7 G/DL (ref 13.3–17.7)
IMM GRANULOCYTES # BLD: 0 10E9/L (ref 0–0.4)
IMM GRANULOCYTES NFR BLD: 0.4 %
LDLC SERPL CALC-MCNC: 132 MG/DL
LYMPHOCYTES # BLD AUTO: 1.3 10E9/L (ref 0.8–5.3)
LYMPHOCYTES NFR BLD AUTO: 24.4 %
MCH RBC QN AUTO: 30.1 PG (ref 26.5–33)
MCHC RBC AUTO-ENTMCNC: 32.9 G/DL (ref 31.5–36.5)
MCV RBC AUTO: 92 FL (ref 78–100)
MONOCYTES # BLD AUTO: 0.4 10E9/L (ref 0–1.3)
MONOCYTES NFR BLD AUTO: 7.9 %
NEUTROPHILS # BLD AUTO: 3.4 10E9/L (ref 1.6–8.3)
NEUTROPHILS NFR BLD AUTO: 63.3 %
NONHDLC SERPL-MCNC: 146 MG/DL
NRBC # BLD AUTO: 0 10*3/UL
NRBC BLD AUTO-RTO: 0 /100
PLATELET # BLD AUTO: 204 10E9/L (ref 150–450)
POTASSIUM SERPL-SCNC: 4.2 MMOL/L (ref 3.4–5.3)
PROT SERPL-MCNC: 7.4 G/DL (ref 6.8–8.8)
PSA SERPL-ACNC: 1.41 UG/L (ref 0–4)
RBC # BLD AUTO: 4.55 10E12/L (ref 4.4–5.9)
SODIUM SERPL-SCNC: 139 MMOL/L (ref 133–144)
TRIGL SERPL-MCNC: 66 MG/DL
WBC # BLD AUTO: 5.4 10E9/L (ref 4–11)

## 2020-08-27 RX ORDER — TAMSULOSIN HYDROCHLORIDE 0.4 MG/1
0.4 CAPSULE ORAL DAILY
Qty: 30 CAPSULE | Refills: 0 | Status: SHIPPED | OUTPATIENT
Start: 2020-08-27 | End: 2020-10-27

## 2020-08-27 RX ORDER — FINASTERIDE 5 MG/1
5 TABLET, FILM COATED ORAL DAILY
Qty: 90 TABLET | Refills: 3 | Status: SHIPPED | OUTPATIENT
Start: 2020-08-27 | End: 2021-08-22

## 2020-08-27 ASSESSMENT — PAIN SCALES - GENERAL: PAINLEVEL: MILD PAIN (2)

## 2020-08-27 NOTE — NURSING NOTE
Chief Complaint   Patient presents with     Establish Care     pt here to establish care       Sandor Valdivia CMA, EMT at 7:48 AM on 8/27/2020.

## 2020-08-27 NOTE — PROGRESS NOTES
HPI:    Pt. With h/o BPH comes into establish care today. Long h/o BPH currently stable on medication. He follows in Dermatology. He states minor R knee pain for about 2 weeks. No swelling. He tries to get exercise with walking. He works as [employer] [place]  in the [place]. He does not smoke nor abuse EtOH. Mother with h/o systemic mastocytosis. He remains stable with his current CPAP. No other HEENT, cardiopulmonary, abdominal , neurological, systemic, endocrine, vascular, psychiatric complaints.         PE:    Vitals noted, gen, nad, cooperative, alert, he is wearing a mask, neck supple, nl rom, no adenopathy, lungs with good air movement, RRR, S1, S2, no MRG, abdomen, no acute findings, R knee w/o swelling, some minimal tenderness R medial knee, no erythema. Grossly normal neurological exam.       A/P:    1. LISA he remains on CPAP  2. BPH on flomax and proscar; check PSA today and refilled medications today.   3. Colonoscopy 1/31/2019 and repeat in 5  Years  4. Seen in Dermatology 2/4/2020  5. Immunizations; check with insurance regarding Shingrex. Influenza vaccination when available.   6. Elevated BP; he states normally around  range and thinks it is elevated on the basis of his R knee pain. Recommended 24 hour BP monitor to further evaluate.   7. R knee pain; if persistent consider PT, if worse, R knee X-ray and orthopedic evaluation    Total time spent 30 minutes.  More than 50% of the time spent with Mr. Ibrahim on counseling / coordinating his care

## 2020-08-27 NOTE — PATIENT INSTRUCTIONS
Primary Care Center Medication Refill Request Information:  * Please contact your pharmacy regarding ANY request for medication refills.  ** [facility] Prescription Fax = [phone]  * Please allow 3 business days for routine medication refills.  * Please allow 5 business days for controlled substance medication refills.     Primary Care Center Test Result notification information:  *You will be notified with in 7-10 days of your appointment day regarding the results of your test.  If you are on MyChart you will be notified as soon as the provider has reviewed the results and signed off on them.    Primary Care Center: [phone]     Your health care Provider has recommended that you receive the new Shingle vaccine called Shingrix to prevent shingles for ages 50 and above. Many private insurance and Medicare Part D plans cover Shingrix. However, not all insurance carriers cover the entire cost of the Shingrix vaccine if the vaccine is administered at your primary care clinic. The clinic cannot determine your insurance benefits.  Please call your insurance carrier prior. The vaccine comes in two doses. Your second dose should be 2-6 months from your first dose.       Prior to receiving the vaccine, we recommend that you call your insurance carrier and ask them the following questions:            1. Is there a cost difference if I receive the vaccine at my doctor's office or a pharmacy?          2. Does my insurance cover the Shingrix Vaccine and administration of the vaccine?          3. What is my co-pay or deductible for the vaccine?        Please call to schedule a Nurse-Visit only at [phone].  Nurse Visit hours are available Monday, Wednesday, and Friday from 9:00 AM-11:00 AM and 1:00 PM-3:00 PM.

## 2020-09-19 DIAGNOSIS — N40.0 BENIGN PROSTATIC HYPERPLASIA WITHOUT LOWER URINARY TRACT SYMPTOMS: ICD-10-CM

## 2020-09-22 RX ORDER — TAMSULOSIN HYDROCHLORIDE 0.4 MG/1
0.4 CAPSULE ORAL DAILY
Qty: 30 CAPSULE | Refills: 0 | OUTPATIENT
Start: 2020-09-22

## 2020-10-23 DIAGNOSIS — N40.0 BENIGN PROSTATIC HYPERPLASIA WITHOUT LOWER URINARY TRACT SYMPTOMS: ICD-10-CM

## 2020-10-27 RX ORDER — TAMSULOSIN HYDROCHLORIDE 0.4 MG/1
0.4 CAPSULE ORAL DAILY
Qty: 30 CAPSULE | Refills: 1 | Status: SHIPPED | OUTPATIENT
Start: 2020-10-27 | End: 2020-11-23

## 2020-10-27 NOTE — TELEPHONE ENCOUNTER
TAMSULOSIN HCL 0.4 MG CAPSULE   Last Written Prescription Date:  8/27/2020  Last Fill Quantity: 30,   # refills: 0  Last Office Visit :  8/27/2020  Future Office visit:  2/22/2021  Routing refill request to provider for review/approval because:  Only a 30 day supply sent to pharm last order in August 2020.   Is Pt still taking this med on a regular basis?  Also B/P is abnormal last couple of readings?      Follow up B/P check?   Refer to clinic for review     BP Readings from Last 3 Encounters:   08/27/20 (!) 148/89   10/31/19 (!) 188/87   08/16/19 132/80       Justina Villarreal RN  Central Triage Red Flags/Med Refills

## 2020-11-19 DIAGNOSIS — N40.0 BENIGN PROSTATIC HYPERPLASIA WITHOUT LOWER URINARY TRACT SYMPTOMS: ICD-10-CM

## 2020-11-23 RX ORDER — TAMSULOSIN HYDROCHLORIDE 0.4 MG/1
0.4 CAPSULE ORAL DAILY
Qty: 90 CAPSULE | Refills: 0 | Status: SHIPPED | OUTPATIENT
Start: 2020-11-23 | End: 2021-02-19

## 2020-11-23 NOTE — TELEPHONE ENCOUNTER
Last Clinic Visit: 8/27/2020  [facility]    tamsulosin: BP above protocol parameters - refill sent for 90 days supply and FYI to provider.    BP Readings from Last 3 Encounters:   08/27/20 (!) 148/89   10/31/19 (!) 188/87   08/16/19 132/80

## 2021-01-03 ENCOUNTER — HEALTH MAINTENANCE LETTER (OUTPATIENT)
Age: 66
End: 2021-01-03

## 2021-02-09 NOTE — PATIENT INSTRUCTIONS
----- Message from Eileen Skelton Wound Care After a Biopsy    What is a skin biopsy?  A skin biopsy allows the doctor to examine a very small piece of tissue under the microscope to determine the diagnosis and the best treatment for the skin condition. A local anesthetic (numbing medicine)  is injected with a very small needle into the skin area to be tested. A small piece of skin is taken from the area. Sometimes a suture (stitch) is used.     What are the risks of a skin biopsy?  I will experience scar, bleeding, swelling, pain, crusting and redness. I may experience incomplete removal or recurrence. Risks of this procedure are excessive bleeding, bruising, infection, nerve damage, numbness, thick (hypertrophic or keloidal) scar and non-diagnostic biopsy.    How should I care for my wound for the first 24 hours?    Keep the wound dry and covered for 24 hours    If it bleeds, hold direct pressure on the area for 15 minutes. If bleeding does not stop then go to the emergency room    Avoid strenuous exercise the first 1-2 days or as your doctor instructs you    How should I care for the wound after 24 hours?    After 24 hours, remove the bandage    You may bathe or shower as normal    If you had a scalp biopsy, you can shampoo as usual and can use shower water to clean the biopsy site daily    Clean the wound twice a day with gentle soap and water    Do not scrub, be gentle    Apply white petroleum/Vaseline after cleaning the wound with a cotton swab or a clean finger, and keep the site covered with a Bandaid /bandage. Bandages are not necessary with a scalp biopsy    If you are unable to cover the site with a Bandaid /bandage, re-apply ointment 2-3 times a day to keep the site moist. Moisture will help with healing    Avoid strenuous activity for first 1-2 days    Avoid lakes, rivers, pools, and oceans until the stitches are removed or the site is healed    How do I clean my wound?    Wash hands thoroughly with soap or use hand  before all  wound care    Clean the wound with gentle soap and water    Apply white petroleum/Vaseline  to wound after it is clean    Replace the Bandaid /bandage to keep the wound covered for the first few days or as instructed by your doctor    If you had a scalp biopsy, warm shower water to the area on a daily basis should suffice    What should I use to clean my wound?     Cotton-tipped applicators (Qtips )    White petroleum jelly (Vaseline ). Use a clean new container and use Q-tips to apply.    Bandaids   as needed    Gentle soap     How should I care for my wound long term?    Do not get your wound dirty    Keep up with wound care for one week or until the area is healed.    A small scab will form and fall off by itself when the area is completely healed. The area will be red and will become pink in color as it heals. Sun protection is very important for how your scar will turn out. Sunscreen with an SPF 30 or greater is recommended once the area is healed.    When should I call my doctor?  If you have increased:     Pain or swelling    Pus or drainage (clear or slightly yellow drainage is ok)    Temperature over 100F    Spreading redness or warmth around wound    When will I hear about my results?  The biopsy results can take 2-3 weeks to come back. The clinic will call you with the results, send you a CoverMyMedst message, or have you schedule a follow-up clinic or phone time to discuss the results. Contact our clinics if you do not hear from us in 3 weeks.     Who should I call with questions?    [facility]: [phone]     [facility]: [phone]    For urgent needs outside of business hours call the [facility] at [phone] and ask for the dermatology resident on call

## 2021-02-17 DIAGNOSIS — N40.0 BENIGN PROSTATIC HYPERPLASIA WITHOUT LOWER URINARY TRACT SYMPTOMS: ICD-10-CM

## 2021-02-19 RX ORDER — TAMSULOSIN HYDROCHLORIDE 0.4 MG/1
0.4 CAPSULE ORAL DAILY
Qty: 90 CAPSULE | Refills: 0 | Status: SHIPPED | OUTPATIENT
Start: 2021-02-19 | End: 2021-05-13

## 2021-02-19 NOTE — TELEPHONE ENCOUNTER
TAMSULOSIN HCL 0.4 MG CAPSULE      Last Written Prescription Date:  11/23/20  Last Fill Quantity: 90,   # refills: 0  Last Office Visit : 8/27/20  Future Office visit:  2/23/21    Routing refill request to provider for review/approval because:  Elevated Bp  BP Readings from Last 3 Encounters:   08/27/20 (!) 148/89   10/31/19 (!) 188/87   08/16/19 132/80   Given 90 day yvonne on 11/23/20  Thank-you, Rachel DRIVER RN Medication Refill Team

## 2021-02-23 ENCOUNTER — VIRTUAL VISIT (OUTPATIENT)
Dept: INTERNAL MEDICINE | Facility: CLINIC | Age: 66
End: 2021-02-23
Payer: COMMERCIAL

## 2021-02-23 DIAGNOSIS — N40.0 BENIGN PROSTATIC HYPERPLASIA WITHOUT LOWER URINARY TRACT SYMPTOMS: Primary | ICD-10-CM

## 2021-02-23 PROCEDURE — 99212 OFFICE O/P EST SF 10 MIN: CPT | Mod: 95 | Performed by: INTERNAL MEDICINE

## 2021-02-23 NOTE — NURSING NOTE
Chief Complaint   Patient presents with     Recheck Medication     6 month follow up     Kimberly Nissen, EMT at 9:55 AM on 2/23/2021    Video Visit Technology for this patient: No video technology available to patient, please call patient over the phone

## 2021-02-23 NOTE — PROGRESS NOTES
Telephone visit    Guerrero agrees to a telephone visit    Last in-person visit with me 8/27/2020    Immunizations;  COVID vaccination; he got the first dose of the Moderna last week.     Lipids done 8/27/2020 with  and HDL 58    BPH on finasteride and flomax; PSA done 8/27/2020 at 1.41. He remains on these same medications.     BP; reviewed BP readings in Care Everywhere back to 2007 and generally in normal range. More recent values here are more elevated. He  Has not previously been on any BP medications.    He states he sees Dermatology yearly and last visit 2/4/2020.     He states 10/31/2019 with chest complaints (no further sxs.) that he had evaluation with CXR, EKG, and laboratory tests     He has stopped his low dose ASA and we discussed, he does not really have vascular risk factors so he can stay off this for now.     Otherwise he is doing fine. He can follow up with me in about 6 months, hopefully in person    MARY Paredes MD    Total time today 14 minutes and 46 seconds

## 2021-03-18 ENCOUNTER — MYC MEDICAL ADVICE (OUTPATIENT)
Dept: INTERNAL MEDICINE | Facility: CLINIC | Age: 66
End: 2021-03-18

## 2021-04-15 ENCOUNTER — TELEPHONE (OUTPATIENT)
Dept: NURSING | Facility: CLINIC | Age: 66
End: 2021-04-15

## 2021-04-15 ENCOUNTER — OFFICE VISIT (OUTPATIENT)
Dept: INTERNAL MEDICINE | Facility: CLINIC | Age: 66
End: 2021-04-15
Payer: COMMERCIAL

## 2021-04-15 VITALS
BODY MASS INDEX: 26.31 KG/M2 | HEART RATE: 77 BPM | WEIGHT: 168 LBS | OXYGEN SATURATION: 98 % | DIASTOLIC BLOOD PRESSURE: 83 MMHG | SYSTOLIC BLOOD PRESSURE: 150 MMHG

## 2021-04-15 DIAGNOSIS — K62.5 RECTAL BLEEDING: Primary | ICD-10-CM

## 2021-04-15 PROCEDURE — 99213 OFFICE O/P EST LOW 20 MIN: CPT | Performed by: NURSE PRACTITIONER

## 2021-04-15 ASSESSMENT — PAIN SCALES - GENERAL: PAINLEVEL: NO PAIN (0)

## 2021-04-15 NOTE — NURSING NOTE
Chief Complaint   Patient presents with     Rectal Problem     pt here to discuss rectal bleeding for 4 days       Sandor Valdivia CMA, EMT at 3:25 PM on 4/15/2021.

## 2021-04-15 NOTE — TELEPHONE ENCOUNTER
.  [facility]: Nurse Triage Note  SITUATION/BACKGROUND                                                      Guerrero Ibrahim is a 65 year old male who calls with concern blood noted in stool for the past few days.   Today, he reports seeing a major amount of blood in the toilet and his underwear were stained with blood.   Patient reports having hemorrhoids. However, he has never been treated for this.     Denies any other symptoms at this time.   Per rectal problems/hemorrhoids protocol, patient to seek medical care within 24 hrs.   Patient has been scheduled in Primary Care Clinic today at 3:30 for evaluation.     FYI to Primary Care Clinic

## 2021-04-15 NOTE — PROGRESS NOTES
S:     4 days of rectal bleeding without any known cause.  He notes BRBin toilet after a soft, non-painful BM.  He denies any constipation or pain He usually has 2 BMs per day.  No recent changes. He hasn't had any diet changes.  Last colonoscopy was 2 years ago.  No painful hemorrhoids.  Today while sitting in a chair he experienced rectal bleeding that stained his shirt and underwear.Has never had hemorrhoid issues in the past.     Patient Active Problem List   Diagnosis     Solar lentiginosis     Diffuse photodamage of skin     Skin cancer screening          No past medical history on file.       No past surgical history on file.         Social History     Tobacco Use     Smoking status: Never Smoker     Smokeless tobacco: Never Used   Substance Use Topics     Alcohol use: Yes            Family History   Problem Relation Age of Onset     Melanoma No family hx of                Allergies   Allergen Reactions     Percocet [Oxycodone-Acetaminophen] Nausea and Vomiting            Current Outpatient Medications   Medication Sig Dispense Refill     aspirin (ASA) 81 MG EC tablet every 24 hours       finasteride (PROSCAR) 5 MG tablet Take 1 tablet (5 mg) by mouth daily 90 tablet 3     tamsulosin (FLOMAX) 0.4 MG capsule Take 1 capsule (0.4 mg) by mouth daily 90 capsule 0     vitamin D3 (CHOLECALCIFEROL) 2000 units (50 mcg) tablet Take 1 tablet by mouth daily          REVIEW OF SYSTEMS:   negative    O:   BP (!) 150/83   Pulse 77   Wt 76.2 kg (168 lb)   SpO2 98%   BMI 26.31 kg/m    GENERAL APPEARANCE: healthy, alert and no distress  CV: see vs  SKIN:NL  Anus: small multiple hemorrhoids with slight evidence of blood at the 12:40 position.  Anascopy exam: negative except for small amt of blood in the same region.    PSYCHE: normal.    A/P:  Guerrero was seen today for rectal problem.    Diagnoses and all orders for this visit:    Rectal bleeding  No obvious source of bleeding from non-inflamed hemorrhoids. No evidence of  internal hemorrhoids.  -     COLORECTAL SURGERY REFERRAL        -     Keep stools soft and avoid constipation. Keep rectal area clean and dry.  High fiber is needed.       Total time spent today with this patient including chart review, exam time with patient and documentation : 25 minutes      Willa WILLIAM, CNP

## 2021-04-16 ENCOUNTER — TELEPHONE (OUTPATIENT)
Dept: SURGERY | Facility: CLINIC | Age: 66
End: 2021-04-16

## 2021-04-16 NOTE — TELEPHONE ENCOUNTER
"Per note from referring provider \"  4 days of rectal bleeding without any known cause.  He notes BRBin toilet after a soft, non-painful BM.  He denies any constipation or pain He usually has 2 BMs per day.  No recent changes. He hasn't had any diet changes.  Last colonoscopy was 2 years ago.  No painful hemorrhoids.  Today while sitting in a chair he experienced rectal bleeding that stained his shirt and underwear.Has never had hemorrhoid issues in the past.\"     No further action needed. Appropriate timing for appointment   "

## 2021-04-16 NOTE — TELEPHONE ENCOUNTER
[facility] Call Center    Phone Message    May a detailed message be left on voicemail: yes     Reason for Call: Appointment Intake    Referring Provider Name: Willa Garcia    Diagnosis and/or Symptoms: 5 consecutive days of rectal bleeding ( no pain ). No hx of constipation. Small external hemorrhoids. Patient is scheduled next available, 6/16 - Please call patient on work phone - [phone] (during work day hours)    Action Taken: Message routed to:  [facility] Rectal    Travel Screening: Not Applicable

## 2021-05-11 ENCOUNTER — PRE VISIT (OUTPATIENT)
Dept: SURGERY | Facility: CLINIC | Age: 66
End: 2021-05-11

## 2021-05-11 ENCOUNTER — OFFICE VISIT (OUTPATIENT)
Dept: DERMATOLOGY | Facility: CLINIC | Age: 66
End: 2021-05-11
Payer: COMMERCIAL

## 2021-05-11 DIAGNOSIS — D22.9 MULTIPLE MELANOCYTIC NEVI: ICD-10-CM

## 2021-05-11 DIAGNOSIS — L82.1 VERRUCOUS KERATOSIS: Primary | ICD-10-CM

## 2021-05-11 DIAGNOSIS — L82.1 SEBORRHEIC KERATOSIS: ICD-10-CM

## 2021-05-11 PROCEDURE — 99213 OFFICE O/P EST LOW 20 MIN: CPT | Performed by: DERMATOLOGY

## 2021-05-11 ASSESSMENT — PAIN SCALES - GENERAL: PAINLEVEL: NO PAIN (0)

## 2021-05-11 NOTE — NURSING NOTE
"Dermatology Rooming Note    Guerrero Ibrahim's goals for this visit include:   Chief Complaint   Patient presents with     Skin Check     Guerrero is here today for a skin check. He states \" As a kid I burned a lot and blistered. No new concerns today\"     Vicki Lopez, RMA  "

## 2021-05-11 NOTE — LETTER
5/11/2021       RE: Guerrero Ibrahim  [address]  [place] [address]     Dear Colleague,    Thank you for referring your patient, Guerrero Ibrahim, to the [facility] DERMATOLOGY CLINIC [place] at [facility]. Please see a copy of my visit note below.    CHIEF COMPLAINT:  Followup skin check.    SUBJECTIVE:  Guerrero is a very pleasant 65-year-old male with no personal history of skin cancer who presents today for full body skin check.  I initially saw him on 08/22/2019, at which time he had a benign skin check.  He subsequently followed up with my colleague, Dr. Makayla Benitez, in February of 02/2020, at which time a biopsy of the right shoulder demonstrated features most consistent with a verrucous keratosis, though the differential diagnosis also included a desmoplastic trichoepithelioma.  The pathology note from this visit suggested clinical followup.  Today, Guerrero reports that the area appears to have healed over entirely and no recurrent bump is present.  Similarly, he denies any localized areas of itch, pain, bleeding or nonhealing sores and does not believe he has any new or changing moles.    REVIEW OF SYSTEMS:  No recent fevers.    PHYSICAL EXAMINATION:    GENERAL:  This is a well-appearing, well-nourished male with a normal mood and affect, who is oriented x3.  SKIN:  A cutaneous exam of the head, neck, chest, abdomen, back, bilateral upper and lower extremities and buttocks was performed.  On the bilateral cheeks, there are scattered 1 mm light tan macules consistent with solar lentigines.  On the right zygomatic process, there is a 2 mm uniformly brown flat topped papule.  On the back, there are scattered 1-2 mm pigmented flat topped papules consistent with benign nevi.  On the superior shoulder, there is a white-pink flat topped papule consistent with scar.    ASSESSMENT AND PLAN:    1.  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  Clinically, there is no evidence of recurrence today.  He was reassured that there is no concern for malignancy in this area today.  2.  Seborrheic keratosis of the right cheek.  Reassurance was provided.  3.  Solar lentigines and benign appearing nevi.  He was reassured that these lesions were benign.  We discussed the importance of ongoing high SPF sunscreen and other sun protective behaviors.  4.  He will follow up in our clinic in approximately 1 year's time.      Rajinder Pulido MD  Dermatology Attending

## 2021-05-11 NOTE — PROGRESS NOTES
CHIEF COMPLAINT:  Followup skin check.    SUBJECTIVE:  Guerrero is a very pleasant 65-year-old male with no personal history of skin cancer who presents today for full body skin check.  I initially saw him on 08/22/2019, at which time he had a benign skin check.  He subsequently followed up with my colleague, Dr. Makayla Benitez, in February of 02/2020, at which time a biopsy of the right shoulder demonstrated features most consistent with a verrucous keratosis, though the differential diagnosis also included a desmoplastic trichoepithelioma.  The pathology note from this visit suggested clinical followup.  Today, Guerrero reports that the area appears to have healed over entirely and no recurrent bump is present.  Similarly, he denies any localized areas of itch, pain, bleeding or nonhealing sores and does not believe he has any new or changing moles.    REVIEW OF SYSTEMS:  No recent fevers.    PHYSICAL EXAMINATION:    GENERAL:  This is a well-appearing, well-nourished male with a normal mood and affect, who is oriented x3.  SKIN:  A cutaneous exam of the head, neck, chest, abdomen, back, bilateral upper and lower extremities and buttocks was performed.  On the bilateral cheeks, there are scattered 1 mm light tan macules consistent with solar lentigines.  On the right zygomatic process, there is a 2 mm uniformly brown flat topped papule.  On the back, there are scattered 1-2 mm pigmented flat topped papules consistent with benign nevi.  On the superior shoulder, there is a white-pink flat topped papule consistent with scar.    ASSESSMENT AND PLAN:    1.  History of irritated verrucous keratosis, based on biopsy in 2020.  Clinically, there is no evidence of recurrence today.  He was reassured that there is no concern for malignancy in this area today.  2.  Seborrheic keratosis of the right cheek.  Reassurance was provided.  3.  Solar lentigines and benign appearing nevi.  He was reassured that these lesions were benign.   We discussed the importance of ongoing high SPF sunscreen and other sun protective behaviors.  4.  He will follow up in our clinic in approximately 1 year's time.      Rajinder Pulido MD  Dermatology Attending

## 2021-05-12 DIAGNOSIS — N40.0 BENIGN PROSTATIC HYPERPLASIA WITHOUT LOWER URINARY TRACT SYMPTOMS: ICD-10-CM

## 2021-05-13 NOTE — TELEPHONE ENCOUNTER
TAMSULOSIN HCL 0.4 MG CAPSULE    Last Written Prescription Date:  2/19/21  Last Fill Quantity: 90,   # refills: 0  Last Office Visit : 4/15/21  Future Office visit:  none    Routing refill request to provider for review/approval because:  Blood pressure out of range   04/15/21 (!) 150/83   08/27/20 (!) 148/89   10/31/19 (!) 188/87

## 2021-05-14 RX ORDER — TAMSULOSIN HYDROCHLORIDE 0.4 MG/1
CAPSULE ORAL
Qty: 90 CAPSULE | Refills: 1 | Status: SHIPPED | OUTPATIENT
Start: 2021-05-14 | End: 2021-11-10

## 2021-06-06 PROBLEM — L82.1 SEBORRHEIC KERATOSIS: Status: ACTIVE | Noted: 2021-06-06

## 2021-06-06 PROBLEM — D22.9 MULTIPLE MELANOCYTIC NEVI: Status: ACTIVE | Noted: 2021-06-06

## 2021-06-16 ENCOUNTER — OFFICE VISIT (OUTPATIENT)
Dept: SURGERY | Facility: CLINIC | Age: 66
End: 2021-06-16
Attending: NURSE PRACTITIONER
Payer: COMMERCIAL

## 2021-06-16 VITALS
SYSTOLIC BLOOD PRESSURE: 143 MMHG | DIASTOLIC BLOOD PRESSURE: 76 MMHG | HEART RATE: 76 BPM | TEMPERATURE: 97.8 F | WEIGHT: 169.7 LBS | BODY MASS INDEX: 26.63 KG/M2 | HEIGHT: 67 IN | OXYGEN SATURATION: 98 %

## 2021-06-16 DIAGNOSIS — K64.8 INTERNAL HEMORRHOIDS: ICD-10-CM

## 2021-06-16 DIAGNOSIS — K62.5 RECTAL BLEEDING: Primary | ICD-10-CM

## 2021-06-16 PROCEDURE — 99203 OFFICE O/P NEW LOW 30 MIN: CPT | Performed by: NURSE PRACTITIONER

## 2021-06-16 ASSESSMENT — MIFFLIN-ST. JEOR: SCORE: 1503.38

## 2021-06-16 ASSESSMENT — PAIN SCALES - GENERAL: PAINLEVEL: NO PAIN (0)

## 2021-06-16 NOTE — LETTER
"2021       RE: Guerrero Ibrahim   [address]  [place] [address]     Dear Colleague,    Thank you for referring your patient, Guerrero Ibrahim, to the [facility] COLON AND RECTAL SURGERY CLINIC [place] at [facility]. Please see a copy of my visit note below.    Colon and Rectal Surgery Consult Clinic Note    Date: 2021     Referring provider:  STEWART Solomon CNP  [address]  [place] [address]     RE: Guerrero Ibrahim  : 1955  TITUS: 2021    Guerrero Ibrahim is a very pleasant 65 year old male who presents today for rectal bleeding.    HPI:  Guerrero developed rectal bleeding at the beginning of April.  This lasted about 1 week but was quite a bit of blood.  No associated pain.  It has not occurred since.  It was not associated with any hard bowel movements.  He denies any prolapsing tissue.  He did have a colonoscopy last in 2019 and this was normal.  He does have a history of a tubular adenoma in  on colonoscopy.  He is otherwise healthy.  He is not on any blood thinners.  He has normal, soft bowel movements.    Physical Examination: Exam was chaperoned by Valerie Vo MA  BP (!) 143/76 (BP Location: Left arm, Patient Position: Sitting, Cuff Size: Adult Regular)   Pulse 76   Temp 97.8  F (36.6  C) (Oral)   Ht 5' 7\"   Wt 169 lb 11.2 oz   SpO2 98%   BMI 26.58 kg/m    General: Alert, oriented, in no acute distress, sitting comfortably  HEENT: Mucous membranes moist  Perianal external examination:  Perianal skin: Intact with no excoriation or lichenification.  Lesions: No evidence of an external lesion, nodularity, or induration in the perianal region.  Eversion of buttocks: There was not evidence of an anal fissure. Details: N/A.  Skin tags or external hemorrhoids: None.  Digital rectal examination: Was performed.   Sphincter tone: Good.  Palpable lesions: No.  Prostate: Normal.  Other: None.    Anoscopy: " Was performed.   Hemorrhoids: Yes.  Moderate internal hemorrhoids without bleeding  Lesions: No    Assessment/Plan: 65 year old male with a single episode of rectal bleeding that lasted about a week but has resolved and not recurred for 2 months.  Discussed that this was likely hemorrhoidal bleeding and he does have significant hemorrhoids on exam.  Recommended starting a daily fiber supplement.  However, if bleeding returns, would like him to return to clinic for a flexible sigmoidoscopy and could consider hemorrhoid banding at that time. Patient's questions were answered to his stated satisfaction and he is in agreement with this plan.    Medical history:  No past medical history on file.    Surgical history:  No past surgical history on file.    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     Priority: Medium     Multiple melanocytic nevi 06/06/2021     Priority: Medium     Solar lentiginosis 09/14/2019     Priority: Medium     Diffuse photodamage of skin 09/14/2019     Priority: Medium     Skin cancer screening 09/14/2019     Priority: Medium       Medications:  Current Outpatient Medications   Medication Sig Dispense Refill     finasteride (PROSCAR) 5 MG tablet Take 1 tablet (5 mg) by mouth daily 90 tablet 3     tamsulosin (FLOMAX) 0.4 MG capsule TAKE 1 CAPSULE BY MOUTH EVERY DAY 90 capsule 1     vitamin D3 (CHOLECALCIFEROL) 2000 units (50 mcg) tablet Take 1 tablet by mouth daily         Allergies:  Allergies   Allergen Reactions     Percocet [Oxycodone-Acetaminophen] Nausea and Vomiting       Family history:  Family History   Problem Relation Age of Onset     Melanoma No family hx of        Social history:  Social History     Tobacco Use     Smoking status: Never Smoker     Smokeless tobacco: Never Used   Substance Use Topics     Alcohol use: Yes    Marital status: .  Occupation: .    Nursing Notes:   Valerie Kiser, BRANDI  " 6/16/2021  1:09 PM  Signed  Chief Complaint   Patient presents with     New Patient     New consult for rectal bleeding       Vitals:    06/16/21 1259   BP: (!) 143/76   BP Location: Left arm   Patient Position: Sitting   Cuff Size: Adult Regular   Pulse: 76   Temp: 97.8  F (36.6  C)   TempSrc: Oral   SpO2: 98%   Weight: 169 lb 11.2 oz   Height: 5' 7\"       Body mass index is 26.58 kg/m .        Valerie Azevedo CMA         30 minutes spent on the date of the encounter doing chart review, history and exam, documentation and further activities as noted above.     STEWART Suazo, NP-C  Colon and Rectal Surgery   [facility]    This note was created using speech recognition software and may contain unintended word substitutions.        Again, thank you for allowing me to participate in the care of your patient.      Sincerely,    STEWART Suazo CNP      "

## 2021-06-16 NOTE — PROGRESS NOTES
"Colon and Rectal Surgery Consult Clinic Note    Date: 2021     Referring provider:  Willa Garcia, STEWART CNP  [address]  [place] [address]     RE: Guerrero Ibrahim  : 1955  TITUS: 2021    Guerrero Ibrahim is a very pleasant 65 year old male who presents today for rectal bleeding.    HPI:  Guerrero developed rectal bleeding at the beginning of April.  This lasted about 1 week but was quite a bit of blood.  No associated pain.  It has not occurred since.  It was not associated with any hard bowel movements.  He denies any prolapsing tissue.  He did have a colonoscopy last in 2019 and this was normal.  He does have a history of a tubular adenoma in 2013 on colonoscopy.  He is otherwise healthy.  He is not on any blood thinners.  He has normal, soft bowel movements.    Physical Examination: Exam was chaperoned by Valerie Vo MA  BP (!) 143/76 (BP Location: Left arm, Patient Position: Sitting, Cuff Size: Adult Regular)   Pulse 76   Temp 97.8  F (36.6  C) (Oral)   Ht 5' 7\"   Wt 169 lb 11.2 oz   SpO2 98%   BMI 26.58 kg/m    General: Alert, oriented, in no acute distress, sitting comfortably  HEENT: Mucous membranes moist  Perianal external examination:  Perianal skin: Intact with no excoriation or lichenification.  Lesions: No evidence of an external lesion, nodularity, or induration in the perianal region.  Eversion of buttocks: There was not evidence of an anal fissure. Details: N/A.  Skin tags or external hemorrhoids: None.  Digital rectal examination: Was performed.   Sphincter tone: Good.  Palpable lesions: No.  Prostate: Normal.  Other: None.    Anoscopy: Was performed.   Hemorrhoids: Yes.  Moderate internal hemorrhoids without bleeding  Lesions: No    Assessment/Plan: 65 year old male with a single episode of rectal bleeding that lasted about a week but has resolved and not recurred for 2 months.  Discussed that this was likely hemorrhoidal bleeding and he does have " significant hemorrhoids on exam.  Recommended starting a daily fiber supplement.  However, if bleeding returns, would like him to return to clinic for a flexible sigmoidoscopy and could consider hemorrhoid banding at that time. Patient's questions were answered to his stated satisfaction and he is in agreement with this plan.    Medical history:  No past medical history on file.    Surgical history:  No past surgical history on file.    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     Priority: Medium     Multiple melanocytic nevi 06/06/2021     Priority: Medium     Solar lentiginosis 09/14/2019     Priority: Medium     Diffuse photodamage of skin 09/14/2019     Priority: Medium     Skin cancer screening 09/14/2019     Priority: Medium       Medications:  Current Outpatient Medications   Medication Sig Dispense Refill     finasteride (PROSCAR) 5 MG tablet Take 1 tablet (5 mg) by mouth daily 90 tablet 3     tamsulosin (FLOMAX) 0.4 MG capsule TAKE 1 CAPSULE BY MOUTH EVERY DAY 90 capsule 1     vitamin D3 (CHOLECALCIFEROL) 2000 units (50 mcg) tablet Take 1 tablet by mouth daily         Allergies:  Allergies   Allergen Reactions     Percocet [Oxycodone-Acetaminophen] Nausea and Vomiting       Family history:  Family History   Problem Relation Age of Onset     Melanoma No family hx of        Social history:  Social History     Tobacco Use     Smoking status: Never Smoker     Smokeless tobacco: Never Used   Substance Use Topics     Alcohol use: Yes    Marital status: .  Occupation: .    Nursing Notes:   Valerie Kiser CMA  6/16/2021  1:09 PM  Signed  Chief Complaint   Patient presents with     New Patient     New consult for rectal bleeding       Vitals:    06/16/21 1259   BP: (!) 143/76   BP Location: Left arm   Patient Position: Sitting   Cuff Size: Adult Regular   Pulse: 76   Temp: 97.8  F (36.6  C)   TempSrc: Oral   SpO2: 98%  "  Weight: 169 lb 11.2 oz   Height: 5' 7\"       Body mass index is 26.58 kg/m .        Valerie Azevedo CMA         30 minutes spent on the date of the encounter doing chart review, history and exam, documentation and further activities as noted above.     STEWART Suazo, NP-C  Colon and Rectal Surgery   [facility]    This note was created using speech recognition software and may contain unintended word substitutions.    "

## 2021-06-16 NOTE — PATIENT INSTRUCTIONS
1. Start a daily fiber supplement such as Citrucel or Metamucil. Start with once a day and slowly increase up to three times a day, if needed, over the next 4-6 weeks  2. If bleeding returns, would recommend flexible sigmoidoscopy in clinic and could consider hemorrhoid banding

## 2021-06-16 NOTE — NURSING NOTE
"Chief Complaint   Patient presents with     New Patient     New consult for rectal bleeding       Vitals:    06/16/21 1259   BP: (!) 143/76   BP Location: Left arm   Patient Position: Sitting   Cuff Size: Adult Regular   Pulse: 76   Temp: 97.8  F (36.6  C)   TempSrc: Oral   SpO2: 98%   Weight: 169 lb 11.2 oz   Height: 5' 7\"       Body mass index is 26.58 kg/m .        Valerie Azevedo CMA    "

## 2021-08-19 DIAGNOSIS — N40.0 BENIGN PROSTATIC HYPERPLASIA WITHOUT LOWER URINARY TRACT SYMPTOMS: ICD-10-CM

## 2021-08-22 RX ORDER — FINASTERIDE 5 MG/1
TABLET, FILM COATED ORAL
Qty: 90 TABLET | Refills: 1 | Status: SHIPPED | OUTPATIENT
Start: 2021-08-22 | End: 2022-02-21

## 2021-10-10 ENCOUNTER — HEALTH MAINTENANCE LETTER (OUTPATIENT)
Age: 66
End: 2021-10-10

## 2021-11-09 DIAGNOSIS — N40.0 BENIGN PROSTATIC HYPERPLASIA WITHOUT LOWER URINARY TRACT SYMPTOMS: ICD-10-CM

## 2021-11-10 RX ORDER — TAMSULOSIN HYDROCHLORIDE 0.4 MG/1
0.4 CAPSULE ORAL DAILY
Qty: 90 CAPSULE | Refills: 0 | Status: SHIPPED | OUTPATIENT
Start: 2021-11-10 | End: 2022-02-07

## 2021-11-10 NOTE — TELEPHONE ENCOUNTER
TAMSULOSIN HCL 0.4 MG CAPSULE      Last Written Prescription Date:  5/14/21  Last Fill Quantity: 90,   # refills: 1  Last Office Visit : 4/15/21  Future Office visit: none scheduled    Routing refill request to provider for review/approval because:  Blood pressure out of range     BP Readings from Last 3 Encounters:   06/16/21 (!) 143/76   04/15/21 (!) 150/83   08/27/20 (!) 148/89     90 day refill provided per protocol, routed to clinic for follow up

## 2022-01-30 ENCOUNTER — HEALTH MAINTENANCE LETTER (OUTPATIENT)
Age: 67
End: 2022-01-30

## 2022-02-04 DIAGNOSIS — N40.0 BENIGN PROSTATIC HYPERPLASIA WITHOUT LOWER URINARY TRACT SYMPTOMS: ICD-10-CM

## 2022-02-07 RX ORDER — TAMSULOSIN HYDROCHLORIDE 0.4 MG/1
0.4 CAPSULE ORAL DAILY
Qty: 90 CAPSULE | Refills: 0 | Status: SHIPPED | OUTPATIENT
Start: 2022-02-07 | End: 2022-05-11

## 2022-02-07 NOTE — TELEPHONE ENCOUNTER
Last Clinic Visit: 4/15/2021  [facility] [place]

## 2022-02-18 DIAGNOSIS — N40.0 BENIGN PROSTATIC HYPERPLASIA WITHOUT LOWER URINARY TRACT SYMPTOMS: ICD-10-CM

## 2022-02-21 RX ORDER — FINASTERIDE 5 MG/1
1 TABLET, FILM COATED ORAL DAILY
Qty: 90 TABLET | Refills: 0 | Status: SHIPPED | OUTPATIENT
Start: 2022-02-21 | End: 2022-05-25

## 2022-02-21 NOTE — TELEPHONE ENCOUNTER
finasteride (PROSCAR) 5 MG tablet   TAKE 1 TABLET BY MOUTH EVERY DAY     Last Written Prescription Date:  8/22/21  Last Fill Quantity: 90,   # refills: 1  Last Office Visit : 4/15/21  Future Office visit:  2/28/22    90 day to pharmacy. Routing  because:   BP > 59262  6/16/2021   143/76

## 2022-02-26 NOTE — PROGRESS NOTES
HPI;    Mr. Ibrahim comes in for a physical today. Telephone visit with me 2/23/2021. Overall doing well. He does not smoke nor abuse EtOH. He has long h/o LISA using CPAP and would like to have follow up on this. His machine is about 5 years old. He will continue to follow in dermatology for routine skin screeing. He does not have a h/o increased BP but states was elevated recently at his dental provider. No other HEENT, cardiopulmonary, abdominal, , neurological, systemic, psychiatric, lymphatic, endocrine, vascular complaints.         PE:    Vitals noted, gen, nad, cooperative, alert, neck supple nl rom, lungs with good air movement, RRR, S1, S2, no MRG, abdomen, no acute findings. Grossly normal neurological exam.     A/P:    1. Immunizations; Influenza vaccine 10/5/2021.  Shingrix vaccination x 1. Moderna COVID vaccine x 3. Tdap 3/15/2018. Pneumococcal 23 done 8/2/2018. Prevnar 13 done 10/15/2015  2. BPH on Finasteride and Flomax; PSA 1.41 on 8/27/2022  3. Colonoscopy; 1/31/2019 and repeat in 5 years. Seen by Ms. Bhat colorectal 6/16/2021 for rectal bleeding  4. Lipids; 8/27/2020;  and HDL 58; ordered labs 2/26/2022  5. Dermatology; seen by Dr. Pulido 5/11/2021  6. Elevated BP and 24 hour BP monitor   7. LISA on CPAP and placed Sleep Medicine referral; he states LISA for many years and he has had the same machine for about 5 years.

## 2022-02-28 ENCOUNTER — OFFICE VISIT (OUTPATIENT)
Dept: INTERNAL MEDICINE | Facility: CLINIC | Age: 67
End: 2022-02-28
Payer: COMMERCIAL

## 2022-02-28 ENCOUNTER — LAB (OUTPATIENT)
Dept: LAB | Facility: CLINIC | Age: 67
End: 2022-02-28
Payer: COMMERCIAL

## 2022-02-28 VITALS
DIASTOLIC BLOOD PRESSURE: 88 MMHG | SYSTOLIC BLOOD PRESSURE: 151 MMHG | HEIGHT: 68 IN | OXYGEN SATURATION: 100 % | RESPIRATION RATE: 16 BRPM | WEIGHT: 166 LBS | HEART RATE: 76 BPM | BODY MASS INDEX: 25.16 KG/M2

## 2022-02-28 DIAGNOSIS — Z12.5 SCREENING FOR PROSTATE CANCER: ICD-10-CM

## 2022-02-28 DIAGNOSIS — I10 BENIGN ESSENTIAL HYPERTENSION: ICD-10-CM

## 2022-02-28 DIAGNOSIS — G47.33 OSA (OBSTRUCTIVE SLEEP APNEA): ICD-10-CM

## 2022-02-28 DIAGNOSIS — Z13.220 SCREENING CHOLESTEROL LEVEL: ICD-10-CM

## 2022-02-28 DIAGNOSIS — R03.0 ELEVATED BLOOD-PRESSURE READING, WITHOUT DIAGNOSIS OF HYPERTENSION: ICD-10-CM

## 2022-02-28 DIAGNOSIS — Z12.5 SCREENING FOR PROSTATE CANCER: Primary | ICD-10-CM

## 2022-02-28 DIAGNOSIS — E78.00 HIGH BLOOD CHOLESTEROL: ICD-10-CM

## 2022-02-28 LAB
ALBUMIN SERPL-MCNC: 4.1 G/DL (ref 3.4–5)
ALP SERPL-CCNC: 73 U/L (ref 40–150)
ALT SERPL W P-5'-P-CCNC: 27 U/L (ref 0–70)
ANION GAP SERPL CALCULATED.3IONS-SCNC: 6 MMOL/L (ref 3–14)
AST SERPL W P-5'-P-CCNC: 22 U/L (ref 0–45)
BASOPHILS # BLD AUTO: 0.1 10E3/UL (ref 0–0.2)
BASOPHILS NFR BLD AUTO: 1 %
BILIRUB SERPL-MCNC: 0.5 MG/DL (ref 0.2–1.3)
BUN SERPL-MCNC: 15 MG/DL (ref 7–30)
CALCIUM SERPL-MCNC: 8.9 MG/DL (ref 8.5–10.1)
CHLORIDE BLD-SCNC: 107 MMOL/L (ref 94–109)
CHOLEST SERPL-MCNC: 191 MG/DL
CO2 SERPL-SCNC: 27 MMOL/L (ref 20–32)
CREAT SERPL-MCNC: 1 MG/DL (ref 0.66–1.25)
EOSINOPHIL # BLD AUTO: 0.1 10E3/UL (ref 0–0.7)
EOSINOPHIL NFR BLD AUTO: 2 %
ERYTHROCYTE [DISTWIDTH] IN BLOOD BY AUTOMATED COUNT: 11.9 % (ref 10–15)
FASTING STATUS PATIENT QL REPORTED: ABNORMAL
GFR SERPL CREATININE-BSD FRML MDRD: 82 ML/MIN/1.73M2
GLUCOSE BLD-MCNC: 107 MG/DL (ref 70–99)
HCT VFR BLD AUTO: 39.9 % (ref 40–53)
HDLC SERPL-MCNC: 53 MG/DL
HGB BLD-MCNC: 13 G/DL (ref 13.3–17.7)
IMM GRANULOCYTES # BLD: 0 10E3/UL
IMM GRANULOCYTES NFR BLD: 0 %
LDLC SERPL CALC-MCNC: 123 MG/DL
LYMPHOCYTES # BLD AUTO: 2 10E3/UL (ref 0.8–5.3)
LYMPHOCYTES NFR BLD AUTO: 34 %
MCH RBC QN AUTO: 29.5 PG (ref 26.5–33)
MCHC RBC AUTO-ENTMCNC: 32.6 G/DL (ref 31.5–36.5)
MCV RBC AUTO: 91 FL (ref 78–100)
MONOCYTES # BLD AUTO: 0.5 10E3/UL (ref 0–1.3)
MONOCYTES NFR BLD AUTO: 8 %
NEUTROPHILS # BLD AUTO: 3.2 10E3/UL (ref 1.6–8.3)
NEUTROPHILS NFR BLD AUTO: 55 %
NONHDLC SERPL-MCNC: 138 MG/DL
NRBC # BLD AUTO: 0 10E3/UL
NRBC BLD AUTO-RTO: 0 /100
PLATELET # BLD AUTO: 195 10E3/UL (ref 150–450)
POTASSIUM BLD-SCNC: 3.9 MMOL/L (ref 3.4–5.3)
PROT SERPL-MCNC: 7.3 G/DL (ref 6.8–8.8)
PSA SERPL-MCNC: 1.43 UG/L (ref 0–4)
RBC # BLD AUTO: 4.41 10E6/UL (ref 4.4–5.9)
SODIUM SERPL-SCNC: 140 MMOL/L (ref 133–144)
TRIGL SERPL-MCNC: 74 MG/DL
WBC # BLD AUTO: 5.8 10E3/UL (ref 4–11)

## 2022-02-28 PROCEDURE — 80053 COMPREHEN METABOLIC PANEL: CPT | Performed by: PATHOLOGY

## 2022-02-28 PROCEDURE — 80061 LIPID PANEL: CPT | Performed by: PATHOLOGY

## 2022-02-28 PROCEDURE — 99397 PER PM REEVAL EST PAT 65+ YR: CPT | Performed by: INTERNAL MEDICINE

## 2022-02-28 PROCEDURE — 85025 COMPLETE CBC W/AUTO DIFF WBC: CPT | Performed by: PATHOLOGY

## 2022-02-28 PROCEDURE — 36415 COLL VENOUS BLD VENIPUNCTURE: CPT | Performed by: PATHOLOGY

## 2022-02-28 PROCEDURE — G0103 PSA SCREENING: HCPCS | Performed by: PATHOLOGY

## 2022-02-28 RX ORDER — METHYLCELLULOSE 2 G/19G
POWDER, FOR SOLUTION ORAL
COMMUNITY
Start: 2021-05-01

## 2022-02-28 ASSESSMENT — PAIN SCALES - GENERAL: PAINLEVEL: NO PAIN (0)

## 2022-02-28 NOTE — NURSING NOTE
Guerrero Ibrahim is a 66 year old male patient that presents today in clinic for the following:    Chief Complaint   Patient presents with     Physical     No concerns     The patient's allergies and medications were reviewed as noted. A set of vitals were recorded as noted without incident. The patient does not have any other questions for the provider.    Wendy Bahena, EMT at 8:31 AM on 2/28/2022

## 2022-02-28 NOTE — PROGRESS NOTES
Medicare Wellness Health Risk Assessment Questionnaire    What is your marital status?      Who lives in your household?  Wife, adult daughter    Does your home have loose rugs in the hallway:     No    Does your home have grab bars in the bathroom:    No    Does your home have handrails on the stairs?  Yes     Does your home have poorly lit areas?    No    How many times have you fallen in the last year?  0    How many times have you been to the Emergency Room in the last year?  0    How many times have you been hospitalized in the last year?  0    Do you feel threatened or controlled by a partner, ex-partner or anyone in your life?   No    Has anyone hurt you physically, for example by pushing, hitting, slapping or kicking you   or forcing you to have sex?   No    Are you sexually active?    Yes     If yes, with men, women, or both?     Women    If yes, do you more than one current partner?   No    If yes, are you using condoms?    N/A    Have you had any sexually transmitted infections in the last year?   No    Do you have any sexual concerns?    No    Do you need help with the phone, transportation, shopping, preparing meals, housework, laundry, medications or managing money?   No    Have you noticed any hearing difficulties?   No    Do you wear hearing aids?   No    Have you seen a hearing professional such as an audiologist in the last 1 year?   No    Do you have vision difficulty?    No    Do you wear glasses or contacts?   Yes     Have you seen an eye doctor in the last 1 year?   Yes     How many servings of fruits and vegetables do you eat a day?  3    How often do you exercise in a week?  7    What kind of exercise do you do?  Walking 75 minutes, elliptical     Do you wear a seatbelt when driving or riding in a vehicle?     Yes     Do you use tobacco?    No    Do you use e-cigarettes?    No    Do you use any other drugs?   No         Do you drink alcohol?   Yes     If you drink alcohol, how many  drinks per week?  1    Over the last 2 weeks, how often have you been bothered by feeling down, depressed, or hopeless?  Not at all (0)     Over the last 2 weeks, how often have you had little interest or pleasure in doing things?  Not at all (0)    Have you completed an Advance Directives document?  Yes     If yes, have you given a copy to the clinic?   No    Do you need information on Advance Directives?   No      KRISTAN Alvarez, at 9:10 AM on 2/28/2022.

## 2022-03-03 ENCOUNTER — HOSPITAL ENCOUNTER (OUTPATIENT)
Dept: CARDIOLOGY | Facility: CLINIC | Age: 67
Discharge: HOME | End: 2022-03-03
Attending: INTERNAL MEDICINE | Admitting: INTERNAL MEDICINE
Payer: COMMERCIAL

## 2022-03-03 DIAGNOSIS — Z12.5 SCREENING FOR PROSTATE CANCER: ICD-10-CM

## 2022-03-03 DIAGNOSIS — G47.33 OSA (OBSTRUCTIVE SLEEP APNEA): ICD-10-CM

## 2022-03-03 DIAGNOSIS — R03.0 ELEVATED BLOOD-PRESSURE READING, WITHOUT DIAGNOSIS OF HYPERTENSION: ICD-10-CM

## 2022-03-03 DIAGNOSIS — Z13.220 SCREENING CHOLESTEROL LEVEL: ICD-10-CM

## 2022-03-03 DIAGNOSIS — I10 BENIGN ESSENTIAL HYPERTENSION: ICD-10-CM

## 2022-03-03 DIAGNOSIS — E78.00 HIGH BLOOD CHOLESTEROL: ICD-10-CM

## 2022-03-03 PROCEDURE — 93790 AMBL BP MNTR W/SW I&R: CPT | Performed by: INTERNAL MEDICINE

## 2022-03-03 PROCEDURE — 93786 AMBL BP MNTR W/SW REC ONLY: CPT

## 2022-03-03 PROCEDURE — 93788 AMBL BP MNTR W/SW A/R: CPT

## 2022-03-04 ENCOUNTER — TELEPHONE (OUTPATIENT)
Dept: INTERNAL MEDICINE | Facility: CLINIC | Age: 67
End: 2022-03-04

## 2022-03-04 DIAGNOSIS — D64.9 ANEMIA, UNSPECIFIED TYPE: Primary | ICD-10-CM

## 2022-04-01 ENCOUNTER — LAB (OUTPATIENT)
Dept: LAB | Facility: CLINIC | Age: 67
End: 2022-04-01
Payer: COMMERCIAL

## 2022-04-01 ENCOUNTER — TELEPHONE (OUTPATIENT)
Dept: INTERNAL MEDICINE | Facility: CLINIC | Age: 67
End: 2022-04-01

## 2022-04-01 DIAGNOSIS — D64.9 ANEMIA, UNSPECIFIED TYPE: ICD-10-CM

## 2022-04-01 DIAGNOSIS — D64.9 ANEMIA, UNSPECIFIED TYPE: Primary | ICD-10-CM

## 2022-04-01 LAB
BASOPHILS # BLD AUTO: 0.1 10E3/UL (ref 0–0.2)
BASOPHILS NFR BLD AUTO: 1 %
EOSINOPHIL # BLD AUTO: 0.2 10E3/UL (ref 0–0.7)
EOSINOPHIL NFR BLD AUTO: 3 %
ERYTHROCYTE [DISTWIDTH] IN BLOOD BY AUTOMATED COUNT: 11.9 % (ref 10–15)
HCT VFR BLD AUTO: 40.3 % (ref 40–53)
HGB BLD-MCNC: 13 G/DL (ref 13.3–17.7)
IMM GRANULOCYTES # BLD: 0 10E3/UL
IMM GRANULOCYTES NFR BLD: 0 %
LYMPHOCYTES # BLD AUTO: 2.2 10E3/UL (ref 0.8–5.3)
LYMPHOCYTES NFR BLD AUTO: 37 %
MCH RBC QN AUTO: 29.8 PG (ref 26.5–33)
MCHC RBC AUTO-ENTMCNC: 32.3 G/DL (ref 31.5–36.5)
MCV RBC AUTO: 92 FL (ref 78–100)
MONOCYTES # BLD AUTO: 0.6 10E3/UL (ref 0–1.3)
MONOCYTES NFR BLD AUTO: 9 %
NEUTROPHILS # BLD AUTO: 3 10E3/UL (ref 1.6–8.3)
NEUTROPHILS NFR BLD AUTO: 50 %
NRBC # BLD AUTO: 0 10E3/UL
NRBC BLD AUTO-RTO: 0 /100
PLATELET # BLD AUTO: 190 10E3/UL (ref 150–450)
RBC # BLD AUTO: 4.36 10E6/UL (ref 4.4–5.9)
WBC # BLD AUTO: 5.9 10E3/UL (ref 4–11)

## 2022-04-01 PROCEDURE — 36415 COLL VENOUS BLD VENIPUNCTURE: CPT | Performed by: PATHOLOGY

## 2022-04-01 PROCEDURE — 85025 COMPLETE CBC W/AUTO DIFF WBC: CPT | Performed by: PATHOLOGY

## 2022-04-01 NOTE — TELEPHONE ENCOUNTER
"Placed lab order for about 3 months    Dear Guerrero;    Your hemoglobin is stable but just out of the \"normal\" range. Your MCV and other cell lines are normal. I would just recheck in about 3 months. I placed a future lab order today and you can call [phone] to schedule this laboratory appointment.    MARY Paredes MD    "

## 2022-04-04 PROBLEM — E53.9 VITAMIN B DEFICIENCY: Status: ACTIVE | Noted: 2022-04-04

## 2022-04-04 PROBLEM — D64.9 ANEMIA: Status: ACTIVE | Noted: 2022-04-04

## 2022-04-04 PROBLEM — E78.2 MIXED HYPERLIPIDEMIA: Status: ACTIVE | Noted: 2022-04-04

## 2022-04-04 PROBLEM — D12.6 TUBULAR ADENOMA OF COLON: Status: ACTIVE | Noted: 2022-04-04

## 2022-04-05 ENCOUNTER — VIRTUAL VISIT (OUTPATIENT)
Dept: SLEEP MEDICINE | Facility: CLINIC | Age: 67
End: 2022-04-05
Attending: INTERNAL MEDICINE
Payer: COMMERCIAL

## 2022-04-05 VITALS — WEIGHT: 165 LBS | HEIGHT: 68 IN | BODY MASS INDEX: 25.01 KG/M2

## 2022-04-05 DIAGNOSIS — G47.33 OSA (OBSTRUCTIVE SLEEP APNEA): ICD-10-CM

## 2022-04-05 PROCEDURE — 99203 OFFICE O/P NEW LOW 30 MIN: CPT | Mod: 95 | Performed by: PHYSICIAN ASSISTANT

## 2022-04-05 NOTE — NURSING NOTE
Return in about 2 months (around 6/5/2022) for CPAP compliance recheck. Mychart reminder sent.    Carlos Mancilla, Visit facilitator

## 2022-04-05 NOTE — PROGRESS NOTES
Guerrero is a 66 year old who is being evaluated via a billable video visit.      How would you like to obtain your AVS? MyChart  If the video visit is dropped, the invitation should be resent by: Text to cell phone: [phone]  Will anyone else be joining your video visit? Jess Botello      Video Start Time: 11:09 AM  Video-Visit Details    Type of service:  Video Visit    Video End Time:11:42 AM    Originating Location (pt. Location): Home    Distant Location (provider location):  [facility] SLE[facility]     Platform used for Video Visit: "[employer]

## 2022-04-05 NOTE — PROGRESS NOTES
Sleep Consultation:    Date on this visit: 4/5/2022    Guerrero Ibrahim  is referred by Arian Paredes for a sleep consultation.     Primary Physician: Arian Paredes       Guerrero Ibrahim presents to establish care for LISA initially diagnosed in the early 1990s. His medical history is significant for anemia, hyperlipidemia, BPH, vit D deficiency.    He was initially diagnosed with LISA in [place] in 1992.      Guerrero had a second PSG at [facility] in [facility] on 2/13/2013. His AHI was 21.3/hr, O2 emiliano 90%. His sleep efficiency was 35.4%. His weight was 179.4#.    He has been on CPAP since 1992. He feels it changed his life. He thinks his pressure is set at 12 cm, but he is not sure. His machine is over 5 years old. He has a ResMed machine (AirSense S10). It seems to be working well. He uses a nasal mask. He is comfortable with the air pressure. He is not aware of snoring with CPAP. He occasionally gets mask leak, possibly if he has not tightened it enough. He does get dry mouth 1-2 days per week. He does use the water chamber. It does not run out overnight. He denies condensation in the mask or hose.    SLEEP HISTORY  Guerrero goes to sleep at 12:30 AM during the week. He gets up at 7:15 AM but may be awake before then. He falls asleep in 5 minutes.  Guerrero denies difficulty falling asleep.  He wakes up 0-2 times a night for 5 minutes before falling back to sleep.  Guerrero wakes up to adjust his CPAP mask.  On weekends, his bedtime is the same but he may sleep in as late as 8 AM. He estimates he gets 6-6.5 hours of sleep and that is adequate for him.     Patient does use electronics in bed and read in bed (15 min) and does not watch TV in bed and worry in bed about anything.     Guerrero does not do shift work.  He works day shifts. 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 He moved to [place] 3 years ago. He moved back to be near his daughter and grandchildren.      Guerrero does live with his wife and an adult  daughter. His wife has slept in a separate room for several years as she has some sleep issues.  Patient sleeps on his back. He denies morning headaches, morning confusion and restless legs. Guerrero denies any bruxism, sleep walking, sleep talking, dream enactment, sleep paralysis, cataplexy and hypnogogic/hypnopompic hallucinations.    Guerrero denies difficulty breathing through his nose, claustrophobia, reflux at night, heartburn and depression.      Guerrero has lost about 10 pounds since 2013.  Patient describes themself as both a morning and night person.  He would prefer to go to sleep at 12:30 AM and wake up at 7:00 AM.  Patient's [place] Sleepiness score 2/24 inconsistent with daytime sleepiness.  His energy is pretty good.    Guerrero denies napping either purposefully or inadvertently.  He denies dozing while driving.  Patient was counseled on the importance of driving while alert, to pull over if drowsy, or nap before getting into the vehicle if sleepy.  He uses no caffeine.       Allergies:    Allergies   Allergen Reactions     Percocet [Oxycodone-Acetaminophen] Nausea and Vomiting       Medications:    Current Outpatient Medications   Medication Sig Dispense Refill     finasteride (PROSCAR) 5 MG tablet Take 1 tablet (5 mg) by mouth daily 90 tablet 0     methylcellulose (CITRUCEL) powder        tamsulosin (FLOMAX) 0.4 MG capsule Take 1 capsule (0.4 mg) by mouth daily 90 capsule 0     vitamin D3 (CHOLECALCIFEROL) 2000 units (50 mcg) tablet Take 1 tablet by mouth daily         Problem List:  Patient Active Problem List    Diagnosis Date Noted     Anemia 04/04/2022     Priority: Medium     Mixed hyperlipidemia 04/04/2022     Priority: Medium     Vitamin B deficiency 04/04/2022     Priority: Medium     Tubular adenoma of colon 04/04/2022     Priority: Medium     Verrucous keratosis 06/06/2021     Priority: Medium     Multiple melanocytic nevi 06/06/2021     Priority: Medium     Solar lentiginosis 09/14/2019     Priority:  Medium     Diffuse photodamage of skin 09/14/2019     Priority: Medium     Skin cancer screening 09/14/2019     Priority: Medium     BPH (benign prostatic hyperplasia) 01/01/2002     Priority: Medium        Past Medical/Surgical History:  No past medical history on file.  No past surgical history on file.    Social History:  Social History     Socioeconomic History     Marital status:      Spouse name: Not on file     Number of children: Not on file     Years of education: Not on file     Highest education level: Not on file   Occupational History     Not on file   Tobacco Use     Smoking status: Never Smoker     Smokeless tobacco: Never Used   Substance and Sexual Activity     Alcohol use: Yes     Drug use: Never     Sexual activity: Not on file   Other Topics Concern     Parent/sibling w/ CABG, MI or angioplasty before 65F 55M? Not Asked   Social History Narrative     Not on file     Social Determinants of Health     Financial Resource Strain: Not on file   Food Insecurity: Not on file   Transportation Needs: Not on file   Physical Activity: Not on file   Stress: Not on file   Social Connections: Not on file   Intimate Partner Violence: Not on file   Housing Stability: Not on file       Family History:  Family History   Problem Relation Age of Onset     Melanoma No family hx of        Review of Systems:  A complete review of systems reviewed by me is negative with the exeption of what has been mentioned in the history of present illness.  CONSTITUTIONAL: NEGATIVE for weight gain/loss, fever, chills, sweats or night sweats, drug allergies.  EYES: NEGATIVE for changes in vision, blind spots, double vision.  ENT: NEGATIVE for ear pain, sore throat, sinus pain, post-nasal drip, runny nose, bloody nose  CARDIAC: NEGATIVE for fast heartbeats or fluttering in chest, chest pain or pressure, breathlessness when lying flat, swollen legs or swollen feet.  NEUROLOGIC: NEGATIVE headaches, weakness or numbness in the  "arms or legs.  DERMATOLOGIC: NEGATIVE for rashes, new moles or change in mole(s)  PULMONARY: NEGATIVE SOB at rest, SOB with activity, dry cough, productive cough, coughing up blood, wheezing or whistling when breathing.    GASTROINTESTINAL: NEGATIVE for nausea or vomitting, loose or watery stools, fat or grease in stools, constipation, abdominal pain, bowel movements black in color or blood noted.  GENITOURINARY: NEGATIVE for pain during urination, blood in urine, urinating more frequently than usual, irregular menstrual periods.  MUSCULOSKELETAL: NEGATIVE for muscle pain, bone or joint pain, swollen joints.  ENDOCRINE: NEGATIVE for increased thirst or urination, diabetes.  LYMPHATIC: NEGATIVE for swollen lymph nodes, lumps or bumps in the breasts or nipple discharge.    Physical Examination:  Vitals: Ht 1.727 m (5' 8\")   Wt 74.8 kg (165 lb)   BMI 25.09 kg/m        GENERAL APPEARANCE: healthy, alert, no distress and cooperative  EYES: Eyes grossly normal to inspection and wearing glasses  HENT: difficult to observe well over video. Tongue appeared slightly high riding. He sounded to have mild nasal congestion  NECK: no asymmetry, masses, or scars  RESP: No respiratory distress, cough or wheeze    Impression/Plan:    (G47.33) LISA (obstructive sleep apnea)  Comment: Guerrero presents to establish care for previously diagnosed LISA. He moved here from [place] about 3 years ago. He was initially diagnosed in 1992 and he has been on CPAP since then. He feels it changed his life overnight. He would like to get a new machine as his is over 5 years old. It seems to be working well still, but he does not want to go without it should it start to break down. We do not have download data from his machine and he is not observed while sleeping, but he feels he sleeps well and has good energy in the day. He does not know what pressure his machine is set to. His weight is down almost 10 pounds since his last sleep study in 2013.   Plan: " Comprehensive DME        Order placed for a replacement auto CPAP with pressures 10-15 cm. He will bring his current CPAP in to the clinic for a download. He was informed of the shortage of machines and that it may take 6 months to get him a replacement. He was fine with that.       He will follow up with me in approximately two months after getting a replacement CPAP.       Polysomnography reviewed.  Obstructive sleep apnea reviewed.  Complications of untreated sleep apnea were reviewed.    37 minutes were spent on the date of the encounter doing chart review, history and exam, documentation and further activities as noted above.  Bennett Goltz, PA-C       CC: Arian Paredes

## 2022-04-08 ENCOUNTER — TELEPHONE (OUTPATIENT)
Dept: SLEEP MEDICINE | Facility: CLINIC | Age: 67
End: 2022-04-08
Payer: COMMERCIAL

## 2022-04-08 NOTE — TELEPHONE ENCOUNTER
Left message for patient to return call if [facility] would [facility].

## 2022-05-09 DIAGNOSIS — N40.0 BENIGN PROSTATIC HYPERPLASIA WITHOUT LOWER URINARY TRACT SYMPTOMS: Primary | ICD-10-CM

## 2022-05-11 RX ORDER — TAMSULOSIN HYDROCHLORIDE 0.4 MG/1
0.4 CAPSULE ORAL DAILY
Qty: 90 CAPSULE | Refills: 3 | Status: SHIPPED | OUTPATIENT
Start: 2022-05-11 | End: 2023-05-11

## 2022-05-11 NOTE — TELEPHONE ENCOUNTER
Last Clinic Visit: [facility] 2/28/22  Bp addressed by MD at 2/28/22

## 2022-05-23 DIAGNOSIS — N40.0 BENIGN PROSTATIC HYPERPLASIA WITHOUT LOWER URINARY TRACT SYMPTOMS: ICD-10-CM

## 2022-05-25 RX ORDER — FINASTERIDE 5 MG/1
TABLET, FILM COATED ORAL
Qty: 90 TABLET | Refills: 2 | Status: SHIPPED | OUTPATIENT
Start: 2022-05-25 | End: 2023-03-01

## 2022-07-05 ENCOUNTER — LAB (OUTPATIENT)
Dept: LAB | Facility: CLINIC | Age: 67
End: 2022-07-05
Payer: COMMERCIAL

## 2022-07-05 DIAGNOSIS — D64.9 ANEMIA, UNSPECIFIED TYPE: ICD-10-CM

## 2022-07-05 LAB
BASOPHILS # BLD AUTO: 0.1 10E3/UL (ref 0–0.2)
BASOPHILS NFR BLD AUTO: 1 %
EOSINOPHIL # BLD AUTO: 0.2 10E3/UL (ref 0–0.7)
EOSINOPHIL NFR BLD AUTO: 4 %
ERYTHROCYTE [DISTWIDTH] IN BLOOD BY AUTOMATED COUNT: 11.9 % (ref 10–15)
HCT VFR BLD AUTO: 39 % (ref 40–53)
HGB BLD-MCNC: 13.1 G/DL (ref 13.3–17.7)
IMM GRANULOCYTES # BLD: 0 10E3/UL
IMM GRANULOCYTES NFR BLD: 0 %
LYMPHOCYTES # BLD AUTO: 2.4 10E3/UL (ref 0.8–5.3)
LYMPHOCYTES NFR BLD AUTO: 45 %
MCH RBC QN AUTO: 29.4 PG (ref 26.5–33)
MCHC RBC AUTO-ENTMCNC: 33.6 G/DL (ref 31.5–36.5)
MCV RBC AUTO: 88 FL (ref 78–100)
MONOCYTES # BLD AUTO: 0.4 10E3/UL (ref 0–1.3)
MONOCYTES NFR BLD AUTO: 8 %
NEUTROPHILS # BLD AUTO: 2.2 10E3/UL (ref 1.6–8.3)
NEUTROPHILS NFR BLD AUTO: 42 %
PLATELET # BLD AUTO: 198 10E3/UL (ref 150–450)
RBC # BLD AUTO: 4.45 10E6/UL (ref 4.4–5.9)
WBC # BLD AUTO: 5.3 10E3/UL (ref 4–11)

## 2022-07-05 PROCEDURE — 36415 COLL VENOUS BLD VENIPUNCTURE: CPT

## 2022-07-05 PROCEDURE — 85025 COMPLETE CBC W/AUTO DIFF WBC: CPT

## 2022-09-18 ENCOUNTER — HEALTH MAINTENANCE LETTER (OUTPATIENT)
Age: 67
End: 2022-09-18

## 2022-10-11 ENCOUNTER — OFFICE VISIT (OUTPATIENT)
Dept: DERMATOLOGY | Facility: CLINIC | Age: 67
End: 2022-10-11
Payer: COMMERCIAL

## 2022-10-11 DIAGNOSIS — D22.9 MULTIPLE MELANOCYTIC NEVI: ICD-10-CM

## 2022-10-11 DIAGNOSIS — L57.0 AK (ACTINIC KERATOSIS): ICD-10-CM

## 2022-10-11 DIAGNOSIS — L82.1 SEBORRHEIC KERATOSIS: Primary | ICD-10-CM

## 2022-10-11 PROCEDURE — 99213 OFFICE O/P EST LOW 20 MIN: CPT | Mod: 25 | Performed by: DERMATOLOGY

## 2022-10-11 PROCEDURE — 17000 DESTRUCT PREMALG LESION: CPT | Performed by: DERMATOLOGY

## 2022-10-11 ASSESSMENT — PAIN SCALES - GENERAL: PAINLEVEL: NO PAIN (0)

## 2022-10-11 NOTE — LETTER
10/11/2022       RE: Guerrero Ibrahim  [address]  [place] [address]     Dear Colleague,    Thank you for referring your patient, Guerrero Ibrahim, to the [facility] [place] at [facility]. Please see a copy of my visit note below.    CHIEF COMPLAINT:  Followup skin check.     SUBJECTIVE:  Guerrero is a very pleasant 67-year-old male with no personal history of skin cancer who presents today for full body skin check.  He was last seen in clinic with a benign skin check on 5/11/21.    Since then he has not had any obvious new or changing moles, and no nonhealing sores.  He has a small rough scaly dry spot on the top of his head which has been present for several months.      REVIEW OF SYSTEMS:  No recent fevers.     PHYSICAL EXAMINATION:    GENERAL:  This is a well-appearing, well-nourished male with a normal mood and affect, who is oriented x3.  SKIN:  A cutaneous exam of the head, neck, chest, abdomen, back, bilateral upper and lower extremities and buttocks was performed.  On the bilateral cheeks, there are scattered 1 mm light tan macules consistent with solar lentigines.  On the right zygomatic process, there is a 2 mm uniformly brown flat topped papule.  On the back, there are scattered 1-2 mm pigmented flat topped papules consistent with benign nevi.  On the superior shoulder, there is a white-pink flat topped papule consistent with scar.  On the vertex scalp there is a rough scaly 3mm flat topped papule.     ASSESSMENT AND PLAN:    1.  History of irritated verrucous keratosis, based on biopsy in 2020.  Clinically, there is no evidence of recurrence today.  He was reassured that there is no concern for malignancy in this area today.  2.  Seborrheic keratosis of the right cheek.  Reassurance was provided.  3.  Solar lentigines and benign appearing nevi.  He was reassured that these lesions were benign.  We discussed the importance of ongoing  high SPF sunscreen and other sun protective behaviors.  4. AK of vertex scalp.  After informed verbal consent, this was treated with LN2 x 5 seconds x 2 cycles.  The patient tolerated this procedure without complication.  4.  He will follow up in our clinic in approximately 1 year's time.        Rajinder Pulido MD  Dermatology Attending

## 2022-10-11 NOTE — NURSING NOTE
Dermatology Rooming Note    Guerrero Ibrahim's goals for this visit include:   Chief Complaint   Patient presents with     Skin Check     Guerrero is here today for a skin check - spot on scalp      JRODAN Álvarez

## 2022-10-11 NOTE — PROGRESS NOTES
CHIEF COMPLAINT:  Followup skin check.     SUBJECTIVE:  Guerrero is a very pleasant 67-year-old male with no personal history of skin cancer who presents today for full body skin check.  He was last seen in clinic with a benign skin check on 5/11/21.    Since then he has not had any obvious new or changing moles, and no nonhealing sores.  He has a small rough scaly dry spot on the top of his head which has been present for several months.      REVIEW OF SYSTEMS:  No recent fevers.     PHYSICAL EXAMINATION:    GENERAL:  This is a well-appearing, well-nourished male with a normal mood and affect, who is oriented x3.  SKIN:  A cutaneous exam of the head, neck, chest, abdomen, back, bilateral upper and lower extremities and buttocks was performed.  On the bilateral cheeks, there are scattered 1 mm light tan macules consistent with solar lentigines.  On the right zygomatic process, there is a 2 mm uniformly brown flat topped papule.  On the back, there are scattered 1-2 mm pigmented flat topped papules consistent with benign nevi.  On the superior shoulder, there is a white-pink flat topped papule consistent with scar.  On the vertex scalp there is a rough scaly 3mm flat topped papule.     ASSESSMENT AND PLAN:    1.  History of irritated verrucous keratosis, based on biopsy in 2020.  Clinically, there is no evidence of recurrence today.  He was reassured that there is no concern for malignancy in this area today.  2.  Seborrheic keratosis of the right cheek.  Reassurance was provided.  3.  Solar lentigines and benign appearing nevi.  He was reassured that these lesions were benign.  We discussed the importance of ongoing high SPF sunscreen and other sun protective behaviors.  4. AK of vertex scalp.  After informed verbal consent, this was treated with LN2 x 5 seconds x 2 cycles.  The patient tolerated this procedure without complication.  4.  He will follow up in our clinic in approximately 1 year's time.        Rajinder  MD Ashok  Dermatology Attending

## 2022-11-05 PROBLEM — L82.1 SEBORRHEIC KERATOSIS: Status: ACTIVE | Noted: 2022-11-05

## 2022-11-05 PROBLEM — L57.0 AK (ACTINIC KERATOSIS): Status: ACTIVE | Noted: 2022-11-05

## 2023-02-26 DIAGNOSIS — N40.0 BENIGN PROSTATIC HYPERPLASIA WITHOUT LOWER URINARY TRACT SYMPTOMS: ICD-10-CM

## 2023-03-01 RX ORDER — FINASTERIDE 5 MG/1
1 TABLET, FILM COATED ORAL DAILY
Qty: 90 TABLET | Refills: 0 | Status: SHIPPED | OUTPATIENT
Start: 2023-03-01 | End: 2023-06-27

## 2023-03-01 NOTE — TELEPHONE ENCOUNTER
LCV:2/28/2022  [facility] [place]  Scheduling has been notified to contact the pt for appointment.  RF 90day

## 2023-05-01 ENCOUNTER — DOCUMENTATION ONLY (OUTPATIENT)
Dept: SLEEP MEDICINE | Facility: CLINIC | Age: 68
End: 2023-05-01
Payer: COMMERCIAL

## 2023-05-01 DIAGNOSIS — G47.33 OBSTRUCTIVE SLEEP APNEA (ADULT) (PEDIATRIC): Primary | ICD-10-CM

## 2023-05-01 NOTE — PROGRESS NOTES
Order for supplies and a replacement auto CPAP 10-15 renewed. No data in Synopsis as machine is old.  Bennett Goltz, PA-C

## 2023-05-05 ENCOUNTER — DOCUMENTATION ONLY (OUTPATIENT)
Dept: SLEEP MEDICINE | Facility: CLINIC | Age: 68
End: 2023-05-05
Payer: COMMERCIAL

## 2023-05-05 NOTE — PROGRESS NOTES
Patient came to [place] for mask fitting appointment on May 5, 2023. Patient requested to switch masks because poor seal/leak. Discussed the following masks: AIRFIT N20, ESON 2, AND P30I Patient selected a [employer] Mask name: ESON 2 Nasal mask size Large

## 2023-05-07 ENCOUNTER — HEALTH MAINTENANCE LETTER (OUTPATIENT)
Age: 68
End: 2023-05-07

## 2023-05-09 DIAGNOSIS — N40.0 BENIGN PROSTATIC HYPERPLASIA WITHOUT LOWER URINARY TRACT SYMPTOMS: ICD-10-CM

## 2023-05-11 RX ORDER — TAMSULOSIN HYDROCHLORIDE 0.4 MG/1
0.4 CAPSULE ORAL DAILY
Qty: 90 CAPSULE | Refills: 0 | Status: SHIPPED | OUTPATIENT
Start: 2023-05-11 | End: 2023-08-09

## 2023-05-11 NOTE — TELEPHONE ENCOUNTER
tamsulosin (FLOMAX) 0.4 MG capsule      Last Written Prescription Date:  5-11-22  Last Fill Quantity: 90,   # refills: 3  Last Office Visit : 2-28-22  Future Office visit:  None  past due appt/BP check,   3-3-22 24 hour BP monitoring- Reviewed by provider  JIMMY 90 day  FYI to scheduling

## 2023-06-23 DIAGNOSIS — N40.0 BENIGN PROSTATIC HYPERPLASIA WITHOUT LOWER URINARY TRACT SYMPTOMS: ICD-10-CM

## 2023-06-27 RX ORDER — FINASTERIDE 5 MG/1
5 TABLET, FILM COATED ORAL DAILY
Qty: 30 TABLET | Refills: 0 | Status: SHIPPED | OUTPATIENT
Start: 2023-06-27 | End: 2023-07-27

## 2023-07-24 DIAGNOSIS — N40.0 BENIGN PROSTATIC HYPERPLASIA WITHOUT LOWER URINARY TRACT SYMPTOMS: ICD-10-CM

## 2023-07-27 RX ORDER — FINASTERIDE 5 MG/1
1 TABLET, FILM COATED ORAL DAILY
Qty: 30 TABLET | Refills: 0 | Status: SHIPPED | OUTPATIENT
Start: 2023-07-27 | End: 2023-08-24

## 2023-07-27 NOTE — TELEPHONE ENCOUNTER
FINASTERIDE 5 MG TABLET  Last Written Prescription Date:   6/27/2023  Last Fill Quantity: 30,   # refills: 0  Last Office Visit :  2/28/2022  Future Office visit:   7/31/2023  30 Days sent to pharm to cover Pt until visit the end of July      Justina Villarreal RN  Central Triage Red Flags/Med Refills

## 2023-07-30 NOTE — PROGRESS NOTES
"HPI:    Guerrero comes in for a physical today. He states he has seen outside retinal specialist and found a \"freckle\" some concern for melanoma. He has no other skin lesion. He is otherwise doing well and denies additional HEENT, cardiopulmonary, abdominal, , neurological, systemic, psychiatric, lymphatic, endocrine, vascular complaints.     No past medical history on file.  Past Surgical History:   Procedure Laterality Date    APPENDECTOMY  1959    ORTHOPEDIC SURGERY Right     trigger finger x2 in 2000's    SALIVARY GLAND SURGERY      removal 1998    wisdom teeth         PE:    Vitals noted, gen, nad, cooperative, alert, neck supple nl rom, lungs with good air movement, RRR, S1 S2, no MRG, abdomen, no acute findings, Grossly normal neurological exam.     Results for orders placed or performed in visit on 07/31/23   Iron and iron binding capacity     Status: Normal   Result Value Ref Range    Iron 109 61 - 157 ug/dL    Iron Binding Capacity 295 240 - 430 ug/dL    Iron Sat Index 37 15 - 46 %   Lipid panel reflex to direct LDL Fasting     Status: Abnormal   Result Value Ref Range    Cholesterol 187 <200 mg/dL    Triglycerides 176 (H) <150 mg/dL    Direct Measure HDL 43 >=40 mg/dL    LDL Cholesterol Calculated 109 (H) <=100 mg/dL    Non HDL Cholesterol 144 (H) <130 mg/dL    Narrative    Cholesterol  Desirable:  <200 mg/dL    Triglycerides  Normal:  Less than 150 mg/dL  Borderline High:  150-199 mg/dL  High:  200-499 mg/dL  Very High:  Greater than or equal to 500 mg/dL    Direct Measure HDL  Female:  Greater than or equal to 50 mg/dL   Male:  Greater than or equal to 40 mg/dL    LDL Cholesterol  Desirable:  <100mg/dL  Above Desirable:  100-129 mg/dL   Borderline High:  130-159 mg/dL   High:  160-189 mg/dL   Very High:  >= 190 mg/dL    Non HDL Cholesterol  Desirable:  130 mg/dL  Above Desirable:  130-159 mg/dL  Borderline High:  160-189 mg/dL  High:  190-219 mg/dL  Very High:  Greater than or equal to 220 mg/dL "   Comprehensive metabolic panel     Status: Abnormal   Result Value Ref Range    Sodium 140 136 - 145 mmol/L    Potassium 3.9 3.4 - 5.3 mmol/L    Chloride 102 98 - 107 mmol/L    Carbon Dioxide (CO2) 28 22 - 29 mmol/L    Anion Gap 10 7 - 15 mmol/L    Urea Nitrogen 17.8 8.0 - 23.0 mg/dL    Creatinine 1.31 (H) 0.67 - 1.17 mg/dL    Calcium 9.5 8.8 - 10.2 mg/dL    Glucose 87 70 - 99 mg/dL    Alkaline Phosphatase 76 40 - 129 U/L    AST 21 0 - 45 U/L    ALT 21 0 - 70 U/L    Protein Total 6.8 6.4 - 8.3 g/dL    Albumin 4.6 3.5 - 5.2 g/dL    Bilirubin Total 0.4 <=1.2 mg/dL    GFR Estimate 59 (L) >60 mL/min/1.73m2   CBC with platelets and differential     Status: Abnormal   Result Value Ref Range    WBC Count 7.0 4.0 - 11.0 10e3/uL    RBC Count 4.36 (L) 4.40 - 5.90 10e6/uL    Hemoglobin 13.0 (L) 13.3 - 17.7 g/dL    Hematocrit 39.2 (L) 40.0 - 53.0 %    MCV 90 78 - 100 fL    MCH 29.8 26.5 - 33.0 pg    MCHC 33.2 31.5 - 36.5 g/dL    RDW 11.9 10.0 - 15.0 %    Platelet Count 200 150 - 450 10e3/uL    % Neutrophils 50 %    % Lymphocytes 39 %    % Monocytes 7 %    % Eosinophils 3 %    % Basophils 1 %    % Immature Granulocytes 0 %    NRBCs per 100 WBC 0 <1 /100    Absolute Neutrophils 3.6 1.6 - 8.3 10e3/uL    Absolute Lymphocytes 2.7 0.8 - 5.3 10e3/uL    Absolute Monocytes 0.5 0.0 - 1.3 10e3/uL    Absolute Eosinophils 0.2 0.0 - 0.7 10e3/uL    Absolute Basophils 0.1 0.0 - 0.2 10e3/uL    Absolute Immature Granulocytes 0.0 <=0.4 10e3/uL    Absolute NRBCs 0.0 10e3/uL   CBC with platelets and differential     Status: Abnormal    Narrative    The following orders were created for panel order CBC with platelets and differential.  Procedure                               Abnormality         Status                     ---------                               -----------         ------                     CBC with platelets and d...[[id number]]  Abnormal            Final result                 Please view results for these tests on the individual  orders.     A/P:    1. Immunizations;  Shingrix vaccination x 2. Moderna COVID vaccine x 4. Pfizer COVID x 2 (Bi-valent 4/24/2023).  Tdap 3/15/2018. Pneumococcal 23 done 8/2/2018. Prevnar 13 done 10/15/2015  2. BPH on Finasteride and Flomax; PSA 1.43 on 2/28/2022  3. Colonoscopy; 1/31/2019 and repeat in 5 years. Seen by Ms. Bhat colorectal 6/16/2021 for rectal bleeding  4. Lipids; 2/28/2022; , TG's 74 and HDL 53.   5. Dermatology; seen by Dr. Pulido 10/11/2022. Will refer back to Dr. Pulido given his recent eye finding of pigmented retinal lesion. Currently no systemic worrisome sxs.   6. 24 hour BP monitor normal at 129/75 on 3/3/2022.   7. LISA on CPAP.  Sleep Medicine note from  5/1/2023.   8. Hgb slightly low at 13.1 on 7/5/2023. MCV 88 and normal RDW. Labs re-ordered on 7/30/2023.           Medicare Annual Wellness Questionnaire:  This 68 year old year old male presents for a Medicare Wellness Exam.    Providers/clinics involved in care:  Patient Care Team         Relationship Specialty Notifications Start End    Arian Paredes MD PCP - General Internal Medicine  8/27/20     Phone: [phone] Fax: [phone]         [address] [place] [address]    Rajinder Pulido MD MD Dermapathology  8/21/19     Phone: [phone] Fax: [phone]         [address] [place] [address]    Nagi Ayoub PA-C Referring Physician Physician Assistant  8/21/19     referred to derm    Phone: [phone] Fax: [phone]         [address] [place] [address]    Arian Paredes MD Assigned PCP   3/14/21     Phone: [phone] Fax: [phone]         [address] [place] [address]    Kamala Bhat, APRN CNP Nurse Practitioner Colon & Rectal  4/16/21     Phone: [phone] Fax: [phone]         [address] [place] [address]    Goltz, Bennett Ezra, PA-C Assigned Neuroscience Provider   4/10/22     Phone:  [phone] Fax: [phone] [address] [facility] [address]    Rajinder Pulido MD Assigned Surgical Provider   11/12/22     Phone: [phone] Fax: [phone]         [address] [facility] [address] [facility] [address]            Fall Risk Assessment:    Have you fallen 2 or more times in the last year?  No    How many times were you injured due to a fall in the last year?  N/a    PHQ-2:    Over the last 2 weeks, how often have you been bothered by feeling down, depressed, or hopeless?     Not at all (0)     Over the last 2 weeks, how often have you had little interest or pleasure in doing things?     Not at all (0)     Social History:    What is your marital status?      Who lives in your household?  wife    Does your home have loose rugs in the hallway:     Yes     Does your home have grab bars in the bathroom:    No    Does your home have handrails on the stairs?  Yes     Does your home have poorly lit areas?    No    Do you feel threatened or controlled by a partner, ex-partner or anyone in your life?   No    Has anyone hurt you physically, for example by pushing, hitting, slapping or kicking you   or forcing you to have sex?   No    Do you need help with the phone, transportation, shopping, preparing meals, housework, laundry, medications or managing money?   No    Sexual Health:    Are you sexually active?    Yes    If yes, with men, women, or both?   Women    If yes, how many partners?  1    If yes, are you using condoms?    No    Have you had any sexually transmitted infections in the last year?   No    Do you have any sexual concerns?    No      General Health Assessment:    Have you noticed any hearing difficulties?   No    Do you wear hearing aids?   No    Have you seen a hearing professional such as an audiologist in the last 1 year?   No    Do you have vision difficulty?    No    Do you wear glasses or contacts?   Yes     Have you seen an eye doctor in the last 1 year?   Yes      How many servings of fruits and vegetables do you eat a day?  2 cups and 2 cups    How often do you exercise in a week?  7 days    How long and what kind of exercise do you do?  Walking, elliptical 30-60 minutes    Tobacco and Alcohol History:    Do you use tobacco/nicotine products?    No    If yes, please list the method of use and average weekly consumption?  N/a    Do you use any other drugs?   No         Do you drink alcohol?   Yes     If you drink alcohol, how many drinks per week?  1      Advance Directive:    Have you completed an Advance Directives document?  Yes     If yes, have you given a copy to the clinic?   Yes     Do you need information on Advance Directives?   No

## 2023-07-31 ENCOUNTER — LAB (OUTPATIENT)
Dept: LAB | Facility: CLINIC | Age: 68
End: 2023-07-31
Payer: COMMERCIAL

## 2023-07-31 ENCOUNTER — OFFICE VISIT (OUTPATIENT)
Dept: INTERNAL MEDICINE | Facility: CLINIC | Age: 68
End: 2023-07-31
Payer: COMMERCIAL

## 2023-07-31 VITALS
WEIGHT: 169.1 LBS | SYSTOLIC BLOOD PRESSURE: 156 MMHG | DIASTOLIC BLOOD PRESSURE: 82 MMHG | HEIGHT: 68 IN | BODY MASS INDEX: 25.63 KG/M2 | HEART RATE: 64 BPM | OXYGEN SATURATION: 97 %

## 2023-07-31 DIAGNOSIS — Z13.220 SCREENING CHOLESTEROL LEVEL: ICD-10-CM

## 2023-07-31 DIAGNOSIS — E78.00 HIGH BLOOD CHOLESTEROL: ICD-10-CM

## 2023-07-31 DIAGNOSIS — D64.9 ANEMIA, UNSPECIFIED TYPE: Primary | ICD-10-CM

## 2023-07-31 DIAGNOSIS — L98.9 SKIN LESION: ICD-10-CM

## 2023-07-31 DIAGNOSIS — D64.9 ANEMIA, UNSPECIFIED TYPE: ICD-10-CM

## 2023-07-31 DIAGNOSIS — Z12.5 SCREENING FOR PROSTATE CANCER: ICD-10-CM

## 2023-07-31 LAB
ALBUMIN SERPL BCG-MCNC: 4.6 G/DL (ref 3.5–5.2)
ALP SERPL-CCNC: 76 U/L (ref 40–129)
ALT SERPL W P-5'-P-CCNC: 21 U/L (ref 0–70)
ANION GAP SERPL CALCULATED.3IONS-SCNC: 10 MMOL/L (ref 7–15)
AST SERPL W P-5'-P-CCNC: 21 U/L (ref 0–45)
BASOPHILS # BLD AUTO: 0.1 10E3/UL (ref 0–0.2)
BASOPHILS NFR BLD AUTO: 1 %
BILIRUB SERPL-MCNC: 0.4 MG/DL
BUN SERPL-MCNC: 17.8 MG/DL (ref 8–23)
CALCIUM SERPL-MCNC: 9.5 MG/DL (ref 8.8–10.2)
CHLORIDE SERPL-SCNC: 102 MMOL/L (ref 98–107)
CHOLEST SERPL-MCNC: 187 MG/DL
CREAT SERPL-MCNC: 1.31 MG/DL (ref 0.67–1.17)
DEPRECATED HCO3 PLAS-SCNC: 28 MMOL/L (ref 22–29)
EOSINOPHIL # BLD AUTO: 0.2 10E3/UL (ref 0–0.7)
EOSINOPHIL NFR BLD AUTO: 3 %
ERYTHROCYTE [DISTWIDTH] IN BLOOD BY AUTOMATED COUNT: 11.9 % (ref 10–15)
FERRITIN SERPL-MCNC: 68 NG/ML (ref 31–409)
FOLATE SERPL-MCNC: 11.5 NG/ML (ref 4.6–34.8)
GFR SERPL CREATININE-BSD FRML MDRD: 59 ML/MIN/1.73M2
GLUCOSE SERPL-MCNC: 87 MG/DL (ref 70–99)
HCT VFR BLD AUTO: 39.2 % (ref 40–53)
HDLC SERPL-MCNC: 43 MG/DL
HGB BLD-MCNC: 13 G/DL (ref 13.3–17.7)
IMM GRANULOCYTES # BLD: 0 10E3/UL
IMM GRANULOCYTES NFR BLD: 0 %
IRON BINDING CAPACITY (ROCHE): 295 UG/DL (ref 240–430)
IRON SATN MFR SERPL: 37 % (ref 15–46)
IRON SERPL-MCNC: 109 UG/DL (ref 61–157)
LDLC SERPL CALC-MCNC: 109 MG/DL
LYMPHOCYTES # BLD AUTO: 2.7 10E3/UL (ref 0.8–5.3)
LYMPHOCYTES NFR BLD AUTO: 39 %
MCH RBC QN AUTO: 29.8 PG (ref 26.5–33)
MCHC RBC AUTO-ENTMCNC: 33.2 G/DL (ref 31.5–36.5)
MCV RBC AUTO: 90 FL (ref 78–100)
MONOCYTES # BLD AUTO: 0.5 10E3/UL (ref 0–1.3)
MONOCYTES NFR BLD AUTO: 7 %
NEUTROPHILS # BLD AUTO: 3.6 10E3/UL (ref 1.6–8.3)
NEUTROPHILS NFR BLD AUTO: 50 %
NONHDLC SERPL-MCNC: 144 MG/DL
NRBC # BLD AUTO: 0 10E3/UL
NRBC BLD AUTO-RTO: 0 /100
PLATELET # BLD AUTO: 200 10E3/UL (ref 150–450)
POTASSIUM SERPL-SCNC: 3.9 MMOL/L (ref 3.4–5.3)
PROT SERPL-MCNC: 6.8 G/DL (ref 6.4–8.3)
PSA SERPL DL<=0.01 NG/ML-MCNC: 1.65 NG/ML (ref 0–6.5)
RBC # BLD AUTO: 4.36 10E6/UL (ref 4.4–5.9)
SODIUM SERPL-SCNC: 140 MMOL/L (ref 136–145)
TRIGL SERPL-MCNC: 176 MG/DL
VIT B12 SERPL-MCNC: 503 PG/ML (ref 232–1245)
WBC # BLD AUTO: 7 10E3/UL (ref 4–11)

## 2023-07-31 PROCEDURE — 83540 ASSAY OF IRON: CPT | Performed by: PATHOLOGY

## 2023-07-31 PROCEDURE — 36415 COLL VENOUS BLD VENIPUNCTURE: CPT | Performed by: PATHOLOGY

## 2023-07-31 PROCEDURE — G0103 PSA SCREENING: HCPCS | Performed by: PATHOLOGY

## 2023-07-31 PROCEDURE — 99397 PER PM REEVAL EST PAT 65+ YR: CPT | Performed by: INTERNAL MEDICINE

## 2023-07-31 PROCEDURE — 99000 SPECIMEN HANDLING OFFICE-LAB: CPT | Performed by: PATHOLOGY

## 2023-07-31 PROCEDURE — 82746 ASSAY OF FOLIC ACID SERUM: CPT | Performed by: INTERNAL MEDICINE

## 2023-07-31 PROCEDURE — 82607 VITAMIN B-12: CPT | Performed by: INTERNAL MEDICINE

## 2023-07-31 PROCEDURE — 80053 COMPREHEN METABOLIC PANEL: CPT | Performed by: PATHOLOGY

## 2023-07-31 PROCEDURE — 83550 IRON BINDING TEST: CPT | Performed by: PATHOLOGY

## 2023-07-31 PROCEDURE — 85025 COMPLETE CBC W/AUTO DIFF WBC: CPT | Performed by: PATHOLOGY

## 2023-07-31 PROCEDURE — 82728 ASSAY OF FERRITIN: CPT | Performed by: PATHOLOGY

## 2023-07-31 PROCEDURE — 80061 LIPID PANEL: CPT | Performed by: PATHOLOGY

## 2023-07-31 NOTE — NURSING NOTE
Guerrero Ibrahim is a 68 year old male patient that presents today in clinic for the following:    Chief Complaint   Patient presents with    Physical     The patient's allergies and medications were reviewed as noted. A set of vitals were recorded as noted without incident. The patient does not have any other questions for the provider.    Wendy Bahena, EMT at 5:10 PM on 7/31/2023

## 2023-08-05 DIAGNOSIS — N40.0 BENIGN PROSTATIC HYPERPLASIA WITHOUT LOWER URINARY TRACT SYMPTOMS: ICD-10-CM

## 2023-08-09 NOTE — TELEPHONE ENCOUNTER
TAMSULOSIN HCL 0.4 MG CAPSULE      Last Written Prescription Date:  5/11/23  Last Fill Quantity: 90,   # refills: 0  Last Office Visit : 7/31/23  Future Office visit:  11/1/23    Routing refill request to provider for review/approval because:  BP elevated    BP Readings from Last 3 Encounters:   07/31/23 (!) 156/82   02/28/22 (!) 151/88   06/16/21 (!) 143/76

## 2023-08-10 RX ORDER — TAMSULOSIN HYDROCHLORIDE 0.4 MG/1
0.4 CAPSULE ORAL DAILY
Qty: 90 CAPSULE | Refills: 3 | Status: SHIPPED | OUTPATIENT
Start: 2023-08-10 | End: 2024-08-22

## 2023-08-21 DIAGNOSIS — N40.0 BENIGN PROSTATIC HYPERPLASIA WITHOUT LOWER URINARY TRACT SYMPTOMS: ICD-10-CM

## 2023-08-24 RX ORDER — FINASTERIDE 5 MG/1
1 TABLET, FILM COATED ORAL DAILY
Qty: 90 TABLET | Refills: 3 | Status: SHIPPED | OUTPATIENT
Start: 2023-08-24 | End: 2024-08-28

## 2023-09-20 ENCOUNTER — TELEPHONE (OUTPATIENT)
Dept: INTERNAL MEDICINE | Facility: CLINIC | Age: 68
End: 2023-09-20
Payer: COMMERCIAL

## 2023-09-20 DIAGNOSIS — Z12.11 SPECIAL SCREENING FOR MALIGNANT NEOPLASMS, COLON: Primary | ICD-10-CM

## 2023-09-20 NOTE — TELEPHONE ENCOUNTER
[facility] Call Center    Phone Message    May a detailed message be left on voicemail: yes     Reason for Call: Order(s): Other: Colonoscopy  Reason for requested: Last one was in January 2019. Patient is on a 5 year schedule for these procedures.  Date needed: ASAP  Provider name: Dr Paredes    Action Taken: Message routed to:  [facility]    Travel Screening: Not Applicable

## 2023-09-25 NOTE — PROGRESS NOTES
[facility] Dermatology Note  Encounter Date: Sep 26, 2023  Date of Last Dermatology Office Visit: 10/11/2022     Dermatology Problem List:  #. AK of the vertex scalp   -S/p cryotherapy on 10/11/2022  #. Verrucous keratoses  Biopsied in 2020  #. SK of the right cheek.   #. Solar lentigines and benign appearing nevi       ____________________________________________    Assessment & Plan:   1.  History of irritated verrucous keratosis, based on biopsy in 2020.  Clinically, there is no evidence of recurrence today.  He was reassured that there is no concern for malignancy in this area today.  2.  Seborrheic keratosis of the right cheek.  Reassurance was provided.  3.  Solar lentigines and benign appearing nevi.  He was reassured that these lesions were benign.  We discussed the importance of ongoing high SPF sunscreen and other sun protective behaviors.  4. AK of vertex scalp.  After informed verbal consent, this was treated with LN2 x 5 seconds x 2 cycles.  The patient tolerated this procedure without complication.  5. 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  Favor hypertrophic AK but failed cryotherapy at last visit.  - Shave biopsy today - see procedure note below    He will follow up in our clinic in approximately 1 year's time.    Procedure Note  After signed informed consent, the affected area was swabbed with an alcohol pad and injected with 1% lidocaine with 1:100,000 epinephrine.  Biopsy via shave technique was performed and sent to pathology for review.  Hemostasis was achieved with AlCl 20%, and petrolatum and bandage were applied.  Patient tolerated procedure without complication.  Appropriate wound care instructions were provided.      Rajinder Pulido MD  Dermatology Attending  ____________________________________________    CC: followup skin check    HPI:  Mr. Guerrero Ibrahim is a(n) 68 year old male who presents today as a followup patient for skin check.    He was last  seen in clinic with a benign skin check on 10/11/22.     Since then he has not had any obvious new or changing moles, and no nonhealing sores.  He has a small rough scaly dry spot on the top of his head which has been present for several months.       REVIEW OF SYSTEMS:  No recent fevers.     PHYSICAL EXAMINATION:    GENERAL:  This is a well-appearing, well-nourished male with a normal mood and affect, who is oriented x3.  SKIN:  A cutaneous exam of the head, neck, chest, abdomen, back, bilateral upper and lower extremities and buttocks was performed.  On the bilateral cheeks, there are scattered 1 mm light tan macules consistent with solar lentigines.  On the right zygomatic process, there is a 2 mm uniformly brown flat topped papule.  On the back, there are scattered 1-2 mm pigmented flat topped papules consistent with benign nevi.  On the superior shoulder, there is a white-pink flat topped papule consistent with scar.  On the vertex scalp there is a rough scaly 3mm flat topped papule.

## 2023-09-26 ENCOUNTER — TELEPHONE (OUTPATIENT)
Dept: GASTROENTEROLOGY | Facility: CLINIC | Age: 68
End: 2023-09-26

## 2023-09-26 ENCOUNTER — OFFICE VISIT (OUTPATIENT)
Dept: DERMATOLOGY | Facility: CLINIC | Age: 68
End: 2023-09-26
Attending: INTERNAL MEDICINE
Payer: COMMERCIAL

## 2023-09-26 ENCOUNTER — HOSPITAL ENCOUNTER (OUTPATIENT)
Facility: CLINIC | Age: 68
End: 2023-09-26
Attending: SURGERY | Admitting: SURGERY
Payer: COMMERCIAL

## 2023-09-26 DIAGNOSIS — D49.2 NEOPLASM OF UNSPECIFIED BEHAVIOR OF BONE, SOFT TISSUE, AND SKIN: ICD-10-CM

## 2023-09-26 DIAGNOSIS — Z12.83 SKIN CANCER SCREENING: ICD-10-CM

## 2023-09-26 DIAGNOSIS — L82.1 SEBORRHEIC KERATOSIS: ICD-10-CM

## 2023-09-26 DIAGNOSIS — L57.0 AK (ACTINIC KERATOSIS): Primary | ICD-10-CM

## 2023-09-26 PROCEDURE — 99213 OFFICE O/P EST LOW 20 MIN: CPT | Mod: 25 | Performed by: DERMATOLOGY

## 2023-09-26 PROCEDURE — 88305 TISSUE EXAM BY PATHOLOGIST: CPT | Mod: TC | Performed by: DERMATOLOGY

## 2023-09-26 PROCEDURE — 88305 TISSUE EXAM BY PATHOLOGIST: CPT | Mod: 26 | Performed by: DERMATOLOGY

## 2023-09-26 PROCEDURE — 17000 DESTRUCT PREMALG LESION: CPT | Mod: XS | Performed by: DERMATOLOGY

## 2023-09-26 PROCEDURE — 11102 TANGNTL BX SKIN SINGLE LES: CPT | Performed by: DERMATOLOGY

## 2023-09-26 ASSESSMENT — PAIN SCALES - GENERAL: PAINLEVEL: NO PAIN (0)

## 2023-09-26 NOTE — TELEPHONE ENCOUNTER
"Endoscopy Scheduling Screen    Have you had a positive Covid test in the last 14 days?  No    Are you active on MyChart?   Yes    What insurance is in the chart?  Other:  [employer]    Ordering/Referring Provider:   ELEN PARKINSON        (If ordering provider performs procedure, schedule with ordering provider unless otherwise instructed. )    BMI: Estimated body mass index is 25.71 kg/m  as calculated from the following:    Height as of 7/31/23: 1.727 m (5' 8\").    Weight as of 7/31/23: 76.7 kg (169 lb 1.6 oz).     Sedation Ordered  moderate sedation.   If patient BMI > 50 do not schedule in ASC.    If patient BMI > 45 do not schedule at [facility].    Are you taking methadone or Suboxone?  No    Are you taking any prescription medications for pain 3 or more times per week?   No    Do you have a history of malignant hyperthermia or adverse reaction to anesthesia?  No    (Females) Are you currently pregnant?   No     Have you been diagnosed or told you have pulmonary hypertension?   No    Do you have an LVAD?  No    Have you been told you have moderate to severe sleep apnea?  Yes (RN Review required for scheduling unless scheduling in Hospital.)    Have you been told you have COPD, asthma, or any other lung disease?  No    Do you have any heart conditions?  No     Have you ever had an organ transplant?   No    Have you ever had or are you awaiting a heart or lung transplant?   No    Have you had a stroke or transient ischemic attack (TIA aka \"mini stroke\" in the last 6 months?   No    Have you been diagnosed with or been told you have cirrhosis of the liver?   No    Are you currently on dialysis?   No    Do you need assistance transferring?   No    BMI: Estimated body mass index is 25.71 kg/m  as calculated from the following:    Height as of 7/31/23: 1.727 m (5' 8\").    Weight as of 7/31/23: 76.7 kg (169 lb 1.6 oz).     Is patients BMI > 40 and scheduling location [facility]?  No    Do you take an injectable medication for weight " loss or diabetes (excluding insulin)?  No    Do you take the medication Naltrexone?  No    Do you take blood thinners?  No       Prep   Are you currently on dialysis or do you have chronic kidney disease?  No    Do you have a diagnosis of diabetes?  No    Do you have a diagnosis of cystic fibrosis (CF)?  No    On a regular basis do you go 3 -5 days between bowel movements?  No    BMI > 40?  No    Preferred Pharmacy:    [id number] - [place], [place] - [address]  [address] [place] [address]  Phone: [phone] Fax: [phone]      Final Scheduling Details   Colonoscopy prep sent?  Standard MiraLAX    Procedure scheduled  Colonoscopy    Surgeon:  Jenn     Date of procedure:  2/12/24    Pre-OP / PAC:   No - Not required for this site.    Location  [facility] - Per exclusion criteria.    Sedation   Moderate Sedation - Per order.      Patient Reminders:   You will receive a call from a Nurse to review instructions and health history.  This assessment must be completed prior to your procedure.  Failure to complete the Nurse assessment may result in the procedure being cancelled.      On the day of your procedure, please designate an adult(s) who can drive you home stay with you for the next 24 hours. The medicines used in the exam will make you sleepy. You will not be able to drive.      You cannot take public transportation, ride share services, or non-medical taxi service without a responsible caregiver.  Medical transport services are allowed with the requirement that a responsible caregiver will receive you at your destination.  We require that drivers and caregivers are confirmed prior to your procedure.

## 2023-09-26 NOTE — LETTER
9/26/2023       RE: Guerrero Ibrahim  [address]  [place] [address]     Dear Colleague,    Thank you for referring your patient, Guerrero Ibrahim, to the [facility] DERMATOLOGY [facility] [place] [facility]. Please see a copy of my visit note below.    [facility] Dermatology Note  Encounter Date: Sep 26, 2023  Date of Last Dermatology Office Visit: 10/11/2022     Dermatology Problem List:  #. AK of the vertex scalp   -S/p cryotherapy on 10/11/2022  #. Verrucous keratoses  Biopsied in 2020  #. SK of the right cheek.   #. Solar lentigines and benign appearing nevi       ____________________________________________    Assessment & Plan:   1.  History of irritated verrucous keratosis, based on biopsy in 2020.  Clinically, there is no evidence of recurrence today.  He was reassured that there is no concern for malignancy in this area today.  2.  Seborrheic keratosis of the right cheek.  Reassurance was provided.  3.  Solar lentigines and benign appearing nevi.  He was reassured that these lesions were benign.  We discussed the importance of ongoing high SPF sunscreen and other sun protective behaviors.  4. AK of vertex scalp.  After informed verbal consent, this was treated with LN2 x 5 seconds x 2 cycles.  The patient tolerated this procedure without complication.  5. 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  Favor hypertrophic AK but failed cryotherapy at last visit.  - Shave biopsy today - see procedure note below    He will follow up in our clinic in approximately 1 year's time.    Procedure Note  After signed informed consent, the affected area was swabbed with an alcohol pad and injected with 1% lidocaine with 1:100,000 epinephrine.  Biopsy via shave technique was performed and sent to pathology for review.  Hemostasis was achieved with AlCl 20%, and petrolatum and bandage were applied.  Patient tolerated procedure  without complication.  Appropriate wound care instructions were provided.      Rajinder Pulido MD  Dermatology Attending  ____________________________________________    CC: followup skin check    HPI:  Mr. Guerrero Ibrahim is a(n) 68 year old male who presents today as a followup patient for skin check.    He was last seen in clinic with a benign skin check on 10/11/22.     Since then he has not had any obvious new or changing moles, and no nonhealing sores.  He has a small rough scaly dry spot on the top of his head which has been present for several months.       REVIEW OF SYSTEMS:  No recent fevers.     PHYSICAL EXAMINATION:    GENERAL:  This is a well-appearing, well-nourished male with a normal mood and affect, who is oriented x3.  SKIN:  A cutaneous exam of the head, neck, chest, abdomen, back, bilateral upper and lower extremities and buttocks was performed.  On the bilateral cheeks, there are scattered 1 mm light tan macules consistent with solar lentigines.  On the right zygomatic process, there is a 2 mm uniformly brown flat topped papule.  On the back, there are scattered 1-2 mm pigmented flat topped papules consistent with benign nevi.  On the superior shoulder, there is a white-pink flat topped papule consistent with scar.  On the vertex scalp there is a rough scaly 3mm flat topped papule.

## 2023-09-26 NOTE — NURSING NOTE
Dermatology Rooming Note    uGerrero Ibrahim's goals for this visit include:   Chief Complaint   Patient presents with    Skin Check     Annual skin check: pigmented retinal finding     Roxy Duran LPN

## 2023-09-27 LAB
PATH REPORT.COMMENTS IMP SPEC: NORMAL
PATH REPORT.FINAL DX SPEC: NORMAL
PATH REPORT.GROSS SPEC: NORMAL
PATH REPORT.MICROSCOPIC SPEC OTHER STN: NORMAL
PATH REPORT.RELEVANT HX SPEC: NORMAL

## 2023-10-21 PROBLEM — D49.2 NEOPLASM OF UNSPECIFIED BEHAVIOR OF BONE, SOFT TISSUE, AND SKIN: Status: ACTIVE | Noted: 2023-10-21

## 2023-10-31 NOTE — PROGRESS NOTES
HPI:    Last visit with us 7/31/2023 and additional information in that note. Still elevated BP in clinic. Guerrero states only in the last few years BP elevated in clinic. He had 24 hour BP monitor 3/3/2022 normal at 129/75. He states about one month of R ear fullness, occ. Pain. No drainage, No dizziness, no decreased in hearing. His sxs. Come and go. No sinus complaints. No reported viral illness. No other HEENT, cardiopulmonary, abdominal, , neurological, systemic, psychiatric, lymphatic, endocrine, vascular complaints.     No past medical history on file.    Past Surgical History:   Procedure Laterality Date    APPENDECTOMY  1959    ORTHOPEDIC SURGERY Right     trigger finger x2 in 2000's    SALIVARY GLAND SURGERY      removal 1998    wisdom teeth       PE:    Vitals noted, gen, nad, cooperative, alert, B ears clear, no drainage. Tuning fork testing is normal. No abnormal laterization. He has adequate hearing.      A/P:    1. Immunizations;  Shingrix vaccination x 2. Moderna COVID vaccine x 5. Pfizer COVID x 2 (Bi-valent 4/24/2023).  Tdap 3/15/2018. Pneumococcal 23 done 8/2/2018. Prevnar 13 done 10/15/2015. Influenza vaccine done 9/29/2023.   2. BPH on Finasteride and Flomax; PSA 1.65 on 7/31/2023.   3. Colonoscopy; 1/31/2019 and repeat in 5 years. Seen by Ms. Bhat colorectal 6/16/2021 for rectal bleeding  4. Lipids; 2/28/2022; , TG's 74 and HDL 53. Repeat on 7/31/2023; TG's 176, HDl 43 and .   5. Dermatology; seen by Dr. Pulido 10/11/2022 and last 9/26/2023.    6. 24 hour BP monitor normal at 129/75 on 3/3/2022. Offered/recommended low dose Lisinopril but he wants to check at home before starting medication.   7. LISA on CPAP.  Sleep Medicine note from  5/1/2023.   8. Hgb slightly low at 13.1 on 7/5/2023. MCV 88 and normal RDW. Repeat labs 7/31/2023 with Hgb 13.0, MCV 90, RDW 11.9%. Iron studies, ferritin, B12 and Folate normal.   9. R ear complaints. If persists recommend ENT evaluation.  Eustachian tube dysfunction?     30 minutes spent on the date of the encounter doing chart review, history and exam, documentation and further activities as noted above exclusive of procedures and other billable interpretations

## 2023-11-01 ENCOUNTER — OFFICE VISIT (OUTPATIENT)
Dept: INTERNAL MEDICINE | Facility: CLINIC | Age: 68
End: 2023-11-01
Payer: COMMERCIAL

## 2023-11-01 VITALS
HEART RATE: 72 BPM | OXYGEN SATURATION: 98 % | BODY MASS INDEX: 26.81 KG/M2 | DIASTOLIC BLOOD PRESSURE: 75 MMHG | WEIGHT: 176.3 LBS | SYSTOLIC BLOOD PRESSURE: 147 MMHG

## 2023-11-01 DIAGNOSIS — H69.91 DYSFUNCTION OF RIGHT EUSTACHIAN TUBE: Primary | ICD-10-CM

## 2023-11-01 DIAGNOSIS — Z23 NEED FOR VACCINATION: ICD-10-CM

## 2023-11-01 PROCEDURE — 99214 OFFICE O/P EST MOD 30 MIN: CPT | Mod: 25 | Performed by: INTERNAL MEDICINE

## 2023-11-01 PROCEDURE — G0009 ADMIN PNEUMOCOCCAL VACCINE: HCPCS | Performed by: INTERNAL MEDICINE

## 2023-11-01 PROCEDURE — 90677 PCV20 VACCINE IM: CPT | Performed by: INTERNAL MEDICINE

## 2023-11-01 NOTE — PROGRESS NOTES
Guerrero is a 68 year old that presents in clinic today for the following:     Chief Complaint   Patient presents with    Follow Up     Pt here for follow up    Ear Problem     Pt would like to discuss ear blockage that has persisted over the last two months           11/1/2023     8:02 AM   Additional Questions   Roomed by MELVINAL, EMT       Screenings from encounters over the past 10 days    No data recorded       Rickey Marx at 8:05 AM on 11/1/2023

## 2023-11-27 ENCOUNTER — PATIENT OUTREACH (OUTPATIENT)
Dept: GASTROENTEROLOGY | Facility: CLINIC | Age: 68
End: 2023-11-27

## 2023-12-28 ENCOUNTER — TELEPHONE (OUTPATIENT)
Dept: GASTROENTEROLOGY | Facility: CLINIC | Age: 68
End: 2023-12-28

## 2023-12-28 NOTE — TELEPHONE ENCOUNTER
Caller: Guerrero   Reason for Reschedule/Cancellation (please be detailed, any staff messages or encounters to note?): Provider out      Prior to reschedule please review:  Ordering Provider: ELEN PARKINSON  Sedation per order: Moderate  Does patient have any ASC Exclusions, please identify?: y, sleep apnea      Notes on Cancelled Procedure:  Procedure: Lower Endoscopy [Colonoscopy]   Date: 02/12/2024  Location: [facility]; [address] [place] [address], [address] Floor, [place] [address]  Surgeon: Jenn      Rescheduled: No- left VM. Case in depot. Dr. Leyva has availability 03/19/2024. Writer mis-spoke and stated it was a reschedule for Dr. Martinez.

## 2023-12-28 NOTE — TELEPHONE ENCOUNTER
Rescheduled: Yes  Procedure: Lower Endoscopy [Colonoscopy]   Date: 03/19/2024  Location: [facility]; [address], [address], [place] [address]  Surgeon: WISE  Sedation Level Scheduled  Moderate,  Reason for Sedation Level Per Order  Prep/Instructions updated and sent: Yes, Dagmar

## 2024-01-13 ENCOUNTER — HOSPITAL ENCOUNTER (EMERGENCY)
Facility: CLINIC | Age: 69
Discharge: HOME | End: 2024-01-13
Attending: INTERNAL MEDICINE | Admitting: INTERNAL MEDICINE
Payer: COMMERCIAL

## 2024-01-13 ENCOUNTER — APPOINTMENT (OUTPATIENT)
Dept: CT IMAGING | Facility: CLINIC | Age: 69
End: 2024-01-13
Attending: INTERNAL MEDICINE
Payer: COMMERCIAL

## 2024-01-13 ENCOUNTER — APPOINTMENT (OUTPATIENT)
Dept: GENERAL RADIOLOGY | Facility: CLINIC | Age: 69
End: 2024-01-13
Attending: INTERNAL MEDICINE
Payer: COMMERCIAL

## 2024-01-13 VITALS
RESPIRATION RATE: 18 BRPM | SYSTOLIC BLOOD PRESSURE: 154 MMHG | DIASTOLIC BLOOD PRESSURE: 88 MMHG | OXYGEN SATURATION: 98 % | HEART RATE: 84 BPM | TEMPERATURE: 97.6 F

## 2024-01-13 DIAGNOSIS — S01.21XA LACERATION OF NOSE, INITIAL ENCOUNTER: ICD-10-CM

## 2024-01-13 DIAGNOSIS — S09.92XA INJURY OF NOSE, INITIAL ENCOUNTER: ICD-10-CM
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
## 2024-01-13 DIAGNOSIS — S16.1XXA STRAIN OF NECK MUSCLE, INITIAL ENCOUNTER: ICD-10-CM

## 2024-01-13 DIAGNOSIS — M20.012 MALLET FINGER OF LEFT FINGER(S): ICD-10-CM

## 2024-01-13 PROCEDURE — 90471 IMMUNIZATION ADMIN: CPT | Performed by: INTERNAL MEDICINE

## 2024-01-13 PROCEDURE — 73140 X-RAY EXAM OF FINGER(S): CPT | Mod: LT

## 2024-01-13 PROCEDURE — 99284 EMERGENCY DEPT VISIT MOD MDM: CPT | Mod: 25 | Performed by: INTERNAL MEDICINE

## 2024-01-13 PROCEDURE — 90715 TDAP VACCINE 7 YRS/> IM: CPT | Performed by: INTERNAL MEDICINE

## 2024-01-13 PROCEDURE — 72125 CT NECK SPINE W/O DYE: CPT | Mod: 26 | Performed by: RADIOLOGY

## 2024-01-13 PROCEDURE — 250N000011 HC RX IP 250 OP 636: Performed by: INTERNAL MEDICINE

## 2024-01-13 PROCEDURE — 29130 APPL FINGER SPLINT STATIC: CPT | Mod: F4 | Performed by: INTERNAL MEDICINE

## 2024-01-13 PROCEDURE — 250N000009 HC RX 250: Performed by: INTERNAL MEDICINE

## 2024-01-13 PROCEDURE — 70450 CT HEAD/BRAIN W/O DYE: CPT

## 2024-01-13 PROCEDURE — 72125 CT NECK SPINE W/O DYE: CPT

## 2024-01-13 PROCEDURE — 12011 RPR F/E/E/N/L/M 2.5 CM/<: CPT | Mod: 59 | Performed by: INTERNAL MEDICINE

## 2024-01-13 PROCEDURE — 73140 X-RAY EXAM OF FINGER(S): CPT | Mod: 26 | Performed by: STUDENT IN AN ORGANIZED HEALTH CARE EDUCATION/TRAINING PROGRAM

## 2024-01-13 PROCEDURE — 12011 RPR F/E/E/N/L/M 2.5 CM/<: CPT | Performed by: INTERNAL MEDICINE

## 2024-01-13 PROCEDURE — 70450 CT HEAD/BRAIN W/O DYE: CPT | Mod: 26 | Performed by: RADIOLOGY

## 2024-01-13 RX ORDER — LIDOCAINE HYDROCHLORIDE AND EPINEPHRINE 10; 10 MG/ML; UG/ML
10 INJECTION, SOLUTION INFILTRATION; PERINEURAL ONCE
Status: COMPLETED | OUTPATIENT
Start: 2024-01-13 | End: 2024-01-13

## 2024-01-13 RX ADMIN — CLOSTRIDIUM TETANI TOXOID ANTIGEN (FORMALDEHYDE INACTIVATED), CORYNEBACTERIUM DIPHTHERIAE TOXOID ANTIGEN (FORMALDEHYDE INACTIVATED), BORDETELLA PERTUSSIS TOXOID ANTIGEN (GLUTARALDEHYDE INACTIVATED), BORDETELLA PERTUSSIS FILAMENTOUS HEMAGGLUTININ ANTIGEN (FORMALDEHYDE INACTIVATED), BORDETELLA PERTUSSIS PERTACTIN ANTIGEN, AND BORDETELLA PERTUSSIS FIMBRIAE 2/3 ANTIGEN 0.5 ML: 5; 2; 2.5; 5; 3; 5 INJECTION, SUSPENSION INTRAMUSCULAR at 13:36

## 2024-01-13 RX ADMIN — LIDOCAINE HYDROCHLORIDE AND EPINEPHRINE 10 ML: 10; 10 INJECTION, SOLUTION INFILTRATION; PERINEURAL at 13:30

## 2024-01-13 RX ADMIN — Medication: at 11:50

## 2024-01-13 ASSESSMENT — ACTIVITIES OF DAILY LIVING (ADL)
ADLS_ACUITY_SCORE: 35
ADLS_ACUITY_SCORE: 35

## 2024-01-13 NOTE — DISCHARGE INSTRUCTIONS
Please make an appointment to follow up with Your Primary Care Provider for suture removal and [facility] (phone: [phone]) for possible nose fracture in 5-10 days even if entirely better.     Please keep splint on all times, if remove, make sure the joint is straight and make an appointment to follow up with Orthopedics Clinic (phone: [phone]) for mallet finger in 7-14 days even if entirely better.    Patent

## 2024-01-13 NOTE — ED TRIAGE NOTES
Pt biba pt was out for walk with wife, tripped on uneven sidewalk and fell hitting nose on sidewalk. C/o of Neck soreness, no blood thinners, no LOC, fasting diet has not ate since last night. Pain 3/10, Laceration noted to nose, small amt of bleeding

## 2024-01-13 NOTE — ED PROVIDER NOTES
ED Provider Note  [facility]      History     Chief Complaint   Patient presents with    Fall     HPI  Guerrero Ibrahim is a 68 year old male PMH of BPH, anemia who presents to the ER for evaluation of a fall.    Patient states that he was out on a walk when he tripped on uneven pavement and fell face first into the concrete. He denies LOC; but states he hit his nose and mouth. He feels his jaw and teeth are okay, but feels aches in the neck and shoulders. No vision changes. He is not anticoagulated.    Past Medical History  No past medical history on file.  Past Surgical History:   Procedure Laterality Date    APPENDECTOMY  1959    COLONOSCOPY  2003,2008,2013, 2019    ORTHOPEDIC SURGERY Right     trigger finger x2 in 2000's    SALIVARY GLAND SURGERY      removal 1998    wisdom teeth       finasteride (PROSCAR) 5 MG tablet  methylcellulose (CITRUCEL) powder  tamsulosin (FLOMAX) 0.4 MG capsule  vitamin D3 (CHOLECALCIFEROL) 2000 units (50 mcg) tablet      Allergies   Allergen Reactions    Percocet [Oxycodone-Acetaminophen] Nausea and Vomiting     Family History  Family History   Problem Relation Age of Onset    Hypertension Father     Cerebrovascular Disease Father     Diabetes Sister     Melanoma No family hx of      Social History   Social History     Tobacco Use    Smoking status: Never    Smokeless tobacco: Never    Tobacco comments:     never smoked   Substance Use Topics    Alcohol use: Yes     Comment: 1 beer or glass of wine per week    Drug use: Never         A medically appropriate review of systems was performed with pertinent positives and negatives noted in the HPI, and all other systems negative.    Physical Exam   BP: (!) 166/71  Pulse: 68  Temp: 97.9  F (36.6  C)  Resp: 18  SpO2: 97 %  Physical Exam  Constitutional:       General: He is not in acute distress.     Appearance: Normal appearance. He is not toxic-appearing.   HENT:      Head: Laceration present. No raccoon eyes,  Ruggiero's sign, abrasion, contusion, masses, right periorbital erythema or left periorbital erythema. Hair is normal.      Jaw: There is normal jaw occlusion.     Eyes:      General: No scleral icterus.     Conjunctiva/sclera: Conjunctivae normal.      Pupils: Pupils are equal, round, and reactive to light.   Cardiovascular:      Rate and Rhythm: Normal rate and regular rhythm.      Heart sounds: Normal heart sounds. No murmur heard.     No friction rub. No gallop.   Pulmonary:      Effort: Pulmonary effort is normal. No respiratory distress.      Breath sounds: Normal breath sounds. No stridor. No wheezing, rhonchi or rales.   Chest:      Chest wall: No tenderness.   Abdominal:      General: Abdomen is flat. Bowel sounds are normal. There is no distension.      Palpations: Abdomen is soft. There is no mass.      Tenderness: There is no abdominal tenderness. There is no right CVA tenderness, left CVA tenderness, guarding or rebound.      Hernia: No hernia is present.   Musculoskeletal:      Left hand: Deformity (Mallet finger noted) and tenderness present. No swelling, lacerations or bony tenderness. Normal range of motion. Normal strength. Normal sensation. There is no disruption of two-point discrimination. Normal capillary refill. Normal pulse.      Cervical back: Neck supple. No tenderness.      Thoracic back: No tenderness.      Lumbar back: No tenderness.   Skin:     General: Skin is warm.   Neurological:      General: No focal deficit present.      Mental Status: He is alert and oriented to person, place, and time.      Cranial Nerves: No cranial nerve deficit.      Sensory: No sensory deficit.      Motor: No weakness.      Coordination: Coordination normal.      Gait: Gait normal.      Deep Tendon Reflexes: Reflexes normal.           ED Course, Procedures, & Data      [facility]    -Laceration Repair    Date/Time: 1/13/2024 4:15 PM    Performed by: Deangelo Castanon  MD  Authorized by: Deangelo Castanon MD    Risks, benefits and alternatives discussed.      ANESTHESIA (see MAR for exact dosages):     Anesthesia method:  Topical application and local infiltration    Topical anesthetic:  LET    Local anesthetic:  Lidocaine 1% WITH epi  LACERATION DETAILS     Location:  Face    Face location:  Nose    Length (cm):  2    REPAIR TYPE:     Repair type:  Simple    EXPLORATION:     Hemostasis achieved with:  LET and direct pressure    Contaminated: no      TREATMENT:     Area cleansed with:  Betadine    Amount of cleaning:  Standard    Irrigation solution:  Sterile saline    Irrigation method:  Pressure wash    Visualized foreign bodies/material removed: no      SKIN REPAIR     Repair method:  Sutures    Suture size:  6-0    Suture material:  Nylon    Number of sutures:  4    APPROXIMATION     Approximation:  Close    POST-PROCEDURE DETAILS     Dressing:  Antibiotic ointment, non-adherent dressing and adhesive bandage      PROCEDURE    Patient Tolerance:  Patient tolerated the procedure well with no immediate complications                     Results for orders placed or performed during the hospital encounter of 01/13/24   CT Head w/o Contrast     Status: None    Narrative    EXAM: CT HEAD W/O CONTRAST  1/13/2024 12:29 PM     HISTORY:  trauma       COMPARISON:    TECHNIQUE: Using multidetector thin collimation helical acquisition  technique, axial, coronal and sagittal CT images from the skull base  to the vertex were obtained without intravenous contrast.   (topogram) image(s) also obtained and reviewed.    FINDINGS:  No acute intracranial hemorrhage, mass effect, or midline shift. No  acute loss of gray-white matter differentiation in the cerebral  hemispheres. Ventricles are proportionate to the cerebral sulci. Clear  basal cisterns.    The bony calvaria and the bones of the skull base are normal.  Deformity of the cartilaginous structures of the nose and fracture of  the  anterior bony nasal septum.. The visualized portions of the  paranasal sinuses and mastoid air cells are clear. Grossly normal  orbits.       Impression    IMPRESSION:   1.  No acute intracranial pathology.  2.  Traumatic injury to the soft tissues of the nose and fracture of  the anterior bony nasal septum.    I have personally reviewed the examination and initial interpretation  and I agree with the findings.    HILL CAPPS MD         SYSTEM ID:  [id number]   CT Cervical Spine w/o Contrast     Status: None    Narrative    EXAM: CT CERVICAL SPINE W/O CONTRAST  1/13/2024 12:29 PM     HISTORY:  trauma       COMPARISON:  None.    TECHNIQUE: Using multidetector thin collimation helical acquisition  technique, axial, coronal and sagittal CT images through the cervical  spine were obtained without intravenous contrast.    FINDINGS:  Normal vertebral body alignment. No acute fracture or traumatic  subluxation. Calcification of the ligamentous structures of the  odontoid process without evidence of avulsion.  Moderate disc height  loss at all visible levels, worst at C6-7 with large anterior and  posterior osteophyte formation extending posteriorly into the spinal  canal (sagittal series image 46) resulting in moderate spinal canal  stenosis at the level of C6-7.. No prevertebral edema.     No abnormality of the paraspinous soft tissues.      Impression    IMPRESSION:  1. No acute fracture or traumatic subluxation.  2. Severe multilevel degenerative disc disease, worst at C6-7 with  posterior osteophytes resulting in moderate spinal canal stenosis at  C6-7.    I have personally reviewed the examination and initial interpretation  and I agree with the findings.    HILL CAPPS MD         SYSTEM ID:  [id number]   Fingers XR, 2-3 views, left     Status: None    Narrative    EXAM: XR FINGER LEFT G/E 2 VIEWS  LOCATION: [facility]  DATE: 1/13/2024    INDICATION: Trauma.  Pain.  COMPARISON: None.      Impression    IMPRESSION: Flexion at the fifth digit DIP joint, however no evidence of acute fracture or dislocation.       Medications   lido-EPINEPHrine-tetracaine (LET) topical gel GEL ( Topical $Given by Other 1/13/24 1150)   Tdap (tetanus-diphtheria-acell pertussis) (ADACEL) injection 0.5 mL (0.5 mLs Intramuscular $Given 1/13/24 1336)   lidocaine 1% with EPINEPHrine 1:100,000 injection 10 mL (10 mLs Intradermal $Given by Other 1/13/24 1330)     Labs Ordered and Resulted from Time of ED Arrival to Time of ED Departure - No data to display  Fingers XR, 2-3 views, left   Final Result   IMPRESSION: Flexion at the fifth digit DIP joint, however no evidence of acute fracture or dislocation.         CT Cervical Spine w/o Contrast   Final Result   IMPRESSION:   1. No acute fracture or traumatic subluxation.   2. Severe multilevel degenerative disc disease, worst at C6-7 with   posterior osteophytes resulting in moderate spinal canal stenosis at   C6-7.      I have personally reviewed the examination and initial interpretation   and I agree with the findings.      HILL CAPPS MD            SYSTEM ID:  [id number]      CT Head w/o Contrast   Final Result   IMPRESSION:    1.  No acute intracranial pathology.   2.  Traumatic injury to the soft tissues of the nose and fracture of   the anterior bony nasal septum.      I have personally reviewed the examination and initial interpretation   and I agree with the findings.      HILL CAPPS MD            SYSTEM ID:  [id number]             Critical care was not performed.     Medical Decision Making  The patient's presentation was of moderate complexity (an acute complicated injury).    The patient's evaluation involved:  ordering and/or review of 3+ test(s) in this encounter (see separate area of note for details)    The patient's management necessitated moderate risk (prescription drug management including medications given in the  ED).    Assessment & Plan    Mechanical fall with nose injury with lac and possible fracture, left little finger Mallet finger-with no fx on XR, CT head and c spine neg, Tdap done and repair done as above with good approximation and hemostasis, will discharge with follow up with PMD and ENT for suture removal in 5-10 days and possible nose fracture managements one swelling improved in 5-10 days.     As for Mallet finger, placed on splint with extended DIP and follow up with Ortho or Sports Med in 1-2 weeks.    I have reviewed the nursing notes. I have reviewed the findings, diagnosis, plan and need for follow up with the patient.    Discharge Medication List as of 1/13/2024  2:49 PM          Final diagnoses:   Fall, initial encounter   Laceration of nose, initial encounter   Injury of nose, initial encounter   Strain of neck muscle, initial encounter   Mallet finger of left finger(s)     I, Diandra Bowden, am serving as a trained medical scribe to document services personally performed by Deangelo Castanon Md, MD, based on the provider's statements to me.     IDeangelo Md, MD, was physically present and have reviewed and verified the accuracy of this note documented by Diandra Bowden.    Deangelo Castanon Md  [facility] EMERGENCY DEPARTMENT  1/13/2024     Deangelo Castanon MD  01/13/24 [id number]

## 2024-01-13 NOTE — PROGRESS NOTES
Pt. Getting discharge instructions by Scot HOOD RN when pt. Noticed his L pinky was deformed. MD informed and x-ray placed. Finger color is nl, and pt. States pain only when moving. Finger is crooked. No other deformity noted.    Ingrid Diaz RN on 1/13/2024 at 2:29 PM

## 2024-01-17 NOTE — TELEPHONE ENCOUNTER
DIAGNOSIS: (L) 5th mallet finger / [facility] ED / [employer] / x-rays    APPOINTMENT DATE: 1/23/2024   NOTES STATUS DETAILS   OFFICE NOTE from referring provider     MEDICATION LIST Internal    XRAYS (IMAGES & REPORTS) Internal Xray Finger Left 1/13/2024

## 2024-01-22 ENCOUNTER — OFFICE VISIT (OUTPATIENT)
Dept: OTOLARYNGOLOGY | Facility: CLINIC | Age: 69
End: 2024-01-22
Payer: COMMERCIAL

## 2024-01-22 VITALS — DIASTOLIC BLOOD PRESSURE: 89 MMHG | SYSTOLIC BLOOD PRESSURE: 169 MMHG | HEART RATE: 69 BPM

## 2024-01-22 DIAGNOSIS — S02.2XXA CLOSED FRACTURE OF NASAL SEPTUM, INITIAL ENCOUNTER: Primary | ICD-10-CM

## 2024-01-22 PROCEDURE — 99204 OFFICE O/P NEW MOD 45 MIN: CPT | Performed by: STUDENT IN AN ORGANIZED HEALTH CARE EDUCATION/TRAINING PROGRAM

## 2024-01-22 NOTE — PROGRESS NOTES
Facial Plastic and Reconstructive Surgery Consultation    Referring Provider:   No referring provider defined for this encounter.    HPI:   I had the pleasure of seeing Guerrero Ibrahim today in clinic for consultation for facial trauma. Guerrero Ibrahim is a 68 year old male. He was on a walk on 1/13/24 (9 days ago) and fell suffering trauma to his face. He was seen at an outside ED and imaging showed soft tissue trauma and a fracture of the anterior bony nasal septum. He reports at the time of injury there was a lot of bleeding from the nose.  He reports that he was walking and lost his footing and fell onto his nose.  He has significant laceration to the dorsum that was repaired in the emergency room.  Since the time of the injury, as he is having difficulty breathing through the left side of his nose.  He does note that he feels like it opened up some in the last day or so.  He wears a CPAP at night and he has figured out how to use that and is doing okay.  No other history of trauma to the face.  He does not have a history of any surgery on his nose.  He denies any recent bleeding from the nose.  He denies any numbness or tingling or difficulty moving his face.  No vision changes including no double or blurry vision.  His teeth are coming together normally.        Review Of Systems  ROS: 10 point ROS neg other than the symptoms noted above in the HPI.    Patient Active Problem List   Diagnosis    Solar lentiginosis    Diffuse photodamage of skin    Skin cancer screening    Verrucous keratosis    Multiple melanocytic nevi    BPH (benign prostatic hyperplasia)    Anemia    Mixed hyperlipidemia    Vitamin B deficiency    Tubular adenoma of colon    AK (actinic keratosis)    Seborrheic keratosis    Neoplasm of unspecified behavior of bone, soft tissue, and skin     Past Surgical History:   Procedure Laterality Date    APPENDECTOMY  1959    COLONOSCOPY  2003,2008,2013, 2019    ORTHOPEDIC SURGERY Right     trigger finger  x2 in 2000's    SALIVARY GLAND SURGERY      removal 1998    wisdom teeth       Current Outpatient Medications   Medication Sig Dispense Refill    finasteride (PROSCAR) 5 MG tablet Take 1 tablet (5 mg) by mouth daily 90 tablet 3    methylcellulose (CITRUCEL) powder       tamsulosin (FLOMAX) 0.4 MG capsule TAKE 1 CAPSULE (0.4 MG) BY MOUTH DAILY 90 capsule 3    vitamin D3 (CHOLECALCIFEROL) 2000 units (50 mcg) tablet Take 1 tablet by mouth daily       Percocet [oxycodone-acetaminophen]  Social History     Socioeconomic History    Marital status:      Spouse name: Not on file    Number of children: Not on file    Years of education: Not on file    Highest education level: Not on file   Occupational History    Not on file   Tobacco Use    Smoking status: Never    Smokeless tobacco: Never    Tobacco comments:     never smoked   Substance and Sexual Activity    Alcohol use: Yes     Comment: 1 beer or glass of wine per week    Drug use: Never    Sexual activity: Yes     Partners: Female     Birth control/protection: None   Other Topics Concern    Parent/sibling w/ CABG, MI or angioplasty before 65F 55M? Yes   Social History Narrative    Not on file     Social Determinants of Health     Financial Resource Strain: Low Risk  (1/16/2024)    Financial Resource Strain     Within the past 12 months, have you or your family members you live with been unable to get utilities (heat, electricity) when it was really needed?: No   Food Insecurity: Low Risk  (1/16/2024)    Food Insecurity     Within the past 12 months, did you worry that your food would run out before you got money to buy more?: No     Within the past 12 months, did the food you bought just not last and you didn t have money to get more?: No   Transportation Needs: Low Risk  (1/16/2024)    Transportation Needs     Within the past 12 months, has lack of transportation kept you from medical appointments, getting your medicines, non-medical meetings or appointments,  work, or from getting things that you need?: No   Physical Activity: Not on file   Stress: Not on file   Social Connections: Not on file   Interpersonal Safety: Not on file   Housing Stability: Low Risk  (1/16/2024)    Housing Stability     Do you have housing? : Yes     Are you worried about losing your housing?: No     Family History   Problem Relation Age of Onset    Hypertension Father     Cerebrovascular Disease Father     Diabetes Sister     Melanoma No family hx of        PE:  Alert and Oriented, Answering Questions Appropriately  Atraumatic, Normocephalic, Face Symmetric  Skin: Ward 2  Facial Nerve Intact and facial movement symmetric  EOMI, no entrapment  Nasal Exam: There is a soft tissue trauma to the nasal dorsum and we remove the sutures today.  This is expectantly erythematous and swollen.  It is tender to palpation.  Underlying nasal bones appear relatively straight.  Anterior rhinoscopy reveals no septal hematoma.  He has deviation of his cartilaginous septum to the left that is significantly narrowing the left side of his nose.  Chin: Normal   Lips/Teeth/Toungue/Gums: Lips intact  Neck: Trachea midline  Chest: No wheezing, cyanosis, or stridor  Card: not diaphoretic  Neuro/Psych: CN's 2-12 intact, Moves all extremities, ambulation in intact, positive affect, no notable muscle weakness            Imaging:CT head from 1/13/24 is personally reviewed by me. This shows soft tissue trauma to the nasal dorsum. No obvious nasal bone fracture. Fracture of anterior bony nasal septum.      ED notes from his facial trauma encounter were reviewed.       IMPRESSION/PLAN: Guerrero Ibrahim is a 68 year old male presenting after facial trauma resulting in soft tissue injury of the nose and a bony nasal septal fracture.  On exam, there is soft tissue trauma to the nasal dorsum but no nasal bone fractures.  The imaging shows an anterior bony nasal septal fracture.  He has no septal hematoma on exam but he does  have leftward deviation of his cartilaginous septum causing narrowing on that side.  He feels like this is opened up over the last day or so.  We discussed that it is possible he had the septal deviation prior to the trauma and the swelling from the injury caused the difficulties with breathing.  Especially because he is noticing an improvement in breathing over the last day or so.  Given that the bony septum is relatively straight, we discussed watchful waiting.  He may need a septoplasty or nasal surgery down the road his breathing does not improve, but I do not think given the exam findings today that a closed septal reduction would help his breathing at this time. Wound cares were discussed and I would like him to use nasal saline spray.  I would like to see him back in 4 months to reassess at that time.    Photodocumentation was obtained.     I spent a total of 45 minutes in the care of patient Guerrero Ibrahim during today's office visit. This time includes reviewing the patient's chart and prior history, obtaining a history, performing an examination and evaluation and counseling the patient. This time also includes ordering mediations or tests necessary in addition to communication to other member's of the patient's health care team. Time spent in documentation and care coordination is included.

## 2024-01-22 NOTE — NURSING NOTE
Guerrero Ibrahim's chief complaint for this visit includes:  Chief Complaint   Patient presents with    Consult     Nasal trauma. Fracture 1-13-24. Seen in ER at CT done. Sutures still in, needs removal. Was walking on uneven cement and face planted.      PCP: Arian Paredes    Referring Provider:  No referring provider defined for this encounter.    BP (!) 169/89   Pulse 69

## 2024-01-22 NOTE — PROGRESS NOTES
Sports Medicine Clinic           ASSESSMENT and PLAN:     Guerrero was seen today for pain.    Diagnoses and all orders for this visit:    Mallet finger of left hand  Mallet finger with slight radial deviation of the distal phalanx. Has been immobilized with a stack splint which fits well for the past week. Has not let if flex at the DIP. We discussed that he needs continuous splinting for at least 6 weeks. If he bends he will need to restart the clock. We also talked about the radial deviation at the PIP. It is not cosmetically bothersome to him and as it is his 5th digit of his nondominant hand is unlikely to impact function.   - continuous splinting  - follow up in 6 weeks    Return sooner if develops new or worsening symptoms.    Options for treatment and/or follow-up care were reviewed with the patient was actively involved in the decision making process. Patient verbalized understanding and was in agreement with the plan.    Juli Philip MD, [facility]  Primary Care Sports Medicine         SUBJECTIVE       Guerrero Ibrahim is a 68 year old male presenting to clinic today with a chief complaint of  a left 5th mallet finger, referred by  ED.    Onset: 10 day(s) ago. 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 He was unable to extend his finger at the time. Has been wearing stack splint continuously. Has some pain at the DIP.       PMH, Medications and Allergies were reviewed and updated as needed.    ROS:  As noted  above otherwise negative.    Patient Active Problem List   Diagnosis    Solar lentiginosis    Diffuse photodamage of skin    Skin cancer screening    Verrucous keratosis    Multiple melanocytic nevi    BPH (benign prostatic hyperplasia)    Anemia    Mixed hyperlipidemia    Vitamin B deficiency    Tubular adenoma of colon    AK (actinic keratosis)    Seborrheic keratosis    Neoplasm of unspecified behavior of bone, soft tissue, and skin       Current Outpatient Medications   Medication Sig Dispense Refill    finasteride (PROSCAR) 5 MG tablet Take 1 tablet (5 mg) by mouth daily 90 tablet 3    methylcellulose (CITRUCEL) powder       tamsulosin (FLOMAX) 0.4 MG capsule TAKE 1 CAPSULE (0.4 MG) BY MOUTH DAILY 90 capsule 3    vitamin D3 (CHOLECALCIFEROL) 2000 units (50 mcg) tablet Take 1 tablet by mouth daily              OBJECTIVE:       Vitals: There were no vitals filed for this visit.  BMI: There is no height or weight on file to calculate BMI.    Gen:  Well nourished and in no acute distress  HEENT: Extraocular movement intact  Neck: Supple  Pulm:  Breathing Comfortably. No increased respiratory effort.  Psych: Euthymic. Appropriately answers questions    MSK:   Radial deivation of the distal phalanx of the left 5th digit. Minimally tender to palpation over the DIP, no other tenderness throughout the finger. Did not flex or extend as he has already immobilized for the past week. No redness or swelling.     Imaging was personally reviewed and interpreted by me.   EXAM: XR FINGER LEFT G/E 2 VIEWS  LOCATION: [facility]  DATE: 1/13/2024     INDICATION: Trauma. Pain.  COMPARISON: None.                                                                      IMPRESSION: Flexion at the fifth digit DIP joint, however no evidence of acute fracture or dislocation.

## 2024-01-23 ENCOUNTER — OFFICE VISIT (OUTPATIENT)
Dept: ORTHOPEDICS | Facility: CLINIC | Age: 69
End: 2024-01-23
Payer: COMMERCIAL

## 2024-01-23 ENCOUNTER — PRE VISIT (OUTPATIENT)
Dept: ORTHOPEDICS | Facility: CLINIC | Age: 69
End: 2024-01-23

## 2024-01-23 DIAGNOSIS — M20.012 MALLET FINGER OF LEFT HAND: Primary | ICD-10-CM

## 2024-01-23 PROCEDURE — 99203 OFFICE O/P NEW LOW 30 MIN: CPT | Performed by: STUDENT IN AN ORGANIZED HEALTH CARE EDUCATION/TRAINING PROGRAM

## 2024-01-23 NOTE — LETTER
1/23/2024      RE: Guerrero Ibrahim  [address]  [place] [address]     Dear Colleague,    Thank you for referring your patient, Guerrero Ibrahim, to the [facility] SPORTS MEDICINE [facility]IN[facility] [place]. Please see a copy of my visit note below.    Sports Medicine Clinic           ASSESSMENT and PLAN:     Guerrero was seen today for pain.    Diagnoses and all orders for this visit:    Mallet finger of left hand  Mallet finger with slight radial deviation of the distal phalanx. Has been immobilized with a stack splint which fits well for the past week. Has not let if flex at the DIP. We discussed that he needs continuous splinting for at least 6 weeks. If he bends he will need to restart the clock. We also talked about the radial deviation at the PIP. It is not cosmetically bothersome to him and as it is his 5th digit of his nondominant hand is unlikely to impact function.   - continuous splinting  - follow up in 6 weeks    Return sooner if develops new or worsening symptoms.    Options for treatment and/or follow-up care were reviewed with the patient was actively involved in the decision making process. Patient verbalized understanding and was in agreement with the plan.    Juli Philip MD, [facility]  Primary Care Sports Medicine         SUBJECTIVE       Guerrero Ibrahim is a 68 year old male presenting to clinic today with a chief complaint of  a left 5th mallet finger, referred by  ED.    Onset: 10 day(s) ago. 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 He was unable to extend his finger at the time. Has been wearing stack splint continuously. Has some pain at the DIP.       PMH, Medications and Allergies were reviewed and updated as needed.    ROS:  As noted above otherwise negative.    Patient Active Problem List   Diagnosis    Solar lentiginosis    Diffuse photodamage of skin    Skin cancer screening    Verrucous keratosis    Multiple melanocytic nevi    BPH (benign prostatic hyperplasia)    Anemia    Mixed hyperlipidemia    Vitamin B deficiency    Tubular adenoma of colon    AK (actinic keratosis)    Seborrheic keratosis    Neoplasm of unspecified behavior of bone, soft tissue, and skin       Current Outpatient Medications   Medication Sig Dispense Refill    finasteride (PROSCAR) 5 MG tablet Take 1 tablet (5 mg) by mouth daily 90 tablet 3    methylcellulose (CITRUCEL) powder       tamsulosin (FLOMAX) 0.4 MG capsule TAKE 1 CAPSULE (0.4 MG) BY MOUTH DAILY 90 capsule 3    vitamin D3 (CHOLECALCIFEROL) 2000 units (50 mcg) tablet Take 1 tablet by mouth daily              OBJECTIVE:       Vitals: There were no vitals filed for this visit.  BMI: There is no height or weight on file to calculate BMI.    Gen:  Well nourished and in no acute distress  HEENT: Extraocular movement intact  Neck: Supple  Pulm:  Breathing Comfortably. No increased respiratory effort.  Psych: Euthymic. Appropriately answers questions    MSK:   Radial deivation of the distal phalanx of the left 5th digit. Minimally tender to palpation over the DIP, no other tenderness throughout the finger. Did not flex or extend as he has already immobilized for the past week. No redness or swelling.     Imaging was personally reviewed and interpreted by me.   EXAM: XR FINGER LEFT G/E 2 VIEWS  LOCATION: [facility]  DATE: 1/13/2024     INDICATION: Trauma. Pain.  COMPARISON: None.                                                                       IMPRESSION: Flexion at the fifth digit DIP joint, however no evidence of acute fracture or dislocation.        Again, thank you for allowing me to participate in the care of your patient.      Sincerely,    Juli Philip MD

## 2024-02-27 NOTE — TELEPHONE ENCOUNTER
Operative Report    Date: 2/27/2024    Surgeon: Tom Gould M.D.     Assistant: Rony Glaser PA-C    Pre-operative Diagnosis: Morbid Obesity and Hiatal Hernia    Post-operative Diagnosis: Same    Procedure: Laparoscopic Rafiq-en-Y Gastric Bypass, Laparoscopic Hiatal Hernia Repair, Intraoperative Esophagogastroscopy    ASA Classification: II.    Indications: This is a 36 y.o. female who presented with symptoms of Morbid Obesity, here for bariatric surgery.    The indications for a surgical assistant in this surgery were indicated due to complexity of the procedure. Their role included aiding in incision, retraction, holding devices including camera for laparoscopic procedure, and closure of the wound.      Findings: negative leak test    Hiatal hernia was found during dissection of the angle of Hiss.  Given the known connection between hiatal hernias at the gastroesophageal junction and gastric reflux we elected to repair the paraesophageal hernia immediately, as we had discussed pre-operatively.    BMI: body mass index is 37.82 kg/m².    Past Medical History:   Past Medical History:   Diagnosis Date    Anemia     Arthritis     oesteo, hand and spine    DDD (degenerative disc disease), lumbar     L4 L5    Flu-like symptoms 12/15/2022    Heart murmur     Helicobacter pylori gastritis 07/02/2023    IBD (inflammatory bowel disease)     Morbid obesity with BMI of 40.0-44.9, adult ([facility]) 02/04/2022    MRSA (methicillin resistant Staphylococcus aureus)     2016    Preop examination 07/20/2023    Psychiatric problem     depresssion, anxiety, PTSD -from sound of heart monitor.       Wound Classification: Class II, II, Clean Contaminated..    Procedure in detail: The patient was seen and examined in the preoperative holding area.  The risks benefits and alternatives of the procedure were discussed with the patient who wished to proceed with the procedure as described.  The patient was transferred to the operating room  Placed future lab order    Dear Guerrero;    Your labs are mostly normal. Your hemoglobin is slightly low and I recommend we just recheck in about a month. I placed a future laboratory order today. You can call [phone] to schedule this appointment.    MARY Paredes MD     placed in supine position and all pressure points were properly padded.  General endotracheal anesthesia was induced and preoperative antibiotics were given per SCIP protocol.  Patient's abdomen was prepped with ChloraPrep and draped in the normal sterile fashion.  A timeout was performed confirming correct patient, correct procedure, and that all necessary equipment was in the room.      We began the procedure by accessing the abdomen.  The 5 mm Optiview port was used to access the abdomen in the epigastric area off midline to the left. Once we entered the abdomen pneumoperitoneum was achieved and maintained at 15 mmHg carbon dioxide throughout the entirety of the case.  Under direct visualization a 5mm and a 12 mm right upper quadrant working port were placed and a 5 mm left upper quadrant working port was placed.  We then placed the Isa self-retaining liver retraction device allowing us good view of the stomach and the hiatus.    We began the procedure by mobilizing the epigastric fat pad using the ultrasonic energy device.  This was carefully dissected free demonstrating the angle of Hiss as well as the left sarwat.  A hiatal hernia was identified at the hiatus.  We carefully opened the gastrohepatic ligament carried this dissection in a cephalad direction until we encountered the right sarwat.  We then continued dissection carefully around the entirety of the hiatus to mobilize the hiatal hernia sac.  This was grasped retracted in the abdomen we carried a dissection into the mediastinum and carefully mobilized the esophagus and the hernia sac circumferentially inside of the mediastinum.  Great care was taken during this dissection to preserve the vagus nerves.  Once adequate dissection had been completed and greater than 3 cm of esophagus was inside of the abdomen we proceeded to close the hiatus  using a running 0 permanent V-Loc suture. We ensured the laparoscopic karyn was able to pass through the closure  without undo tension.     We then proceeded with our laparoscopic Rafiq-en-Y gastric bypass.  The Visigi lap band sizer was placed into the stomach and inflated to 30 cc.  An appropriate line for the creation of pouch was identified and the lesser curve was entered and a tunnel was created underneath the stomach into the lesser sac.  We then used a single fire of the purple load 60 mm Endo MANN stapler to create the end of our pouch.  The stomach was grasped and elevated and dissection was carried through the lesser sac to the previous hiatal dissection.  We then completed the creation of pouch using 2 fires of the purple load Endo MANN stapler.  This created an approximately 30cc gastric pouch. The LAP-BAND sizer tube was then pressed against the pouch and a gastrotomy was created on the posterior aspect of our gastric pouch.  We then grasped and elevated the greater omentum and bifurcated this along the line of the ligament of Treitz to the edge of the colon.  We then identified the ligament of Treitz grasped the small bowel and counted 60 cm.  This was elevated and an enterotomy was created in the small bowel limb.  The 45 mm purple load Endo MANN stapler was then introduced and this was brought to the gastric pouch to create our gastrojejunostomy.  During this time we ensured appropriate orientation of our omega loop creation by keeping the biliopancreatic limb to the patient's left side.  We closed the common enterotomy of our gastrojejunostomy using a running 2-0 PDS suture.  The LAP-BAND sizer was then passed through the anastomosis and it was confirmed to be patent. We then transected our BP limb from the gastrojejunostomy using a single fire of the tan load Endo MANN stapler to the left of the pouch allowing the BP limb to drop down.  We then measured 150 cm from the gastrojejunostomy along our Rafiq limb.  We then created our jejunojejunostomy by using the ultrasonic device to create enterotomies in the end of  our BP limb and at 150 cm of our Rafiq limb.  The anastomosis was then created with a single fire of the 60 mm tan load Endo MANN stapler.  The common enterotomy was closed with a running 2-0 PDS suture.  We then closed the mesenteric defect of our newly constructed jejunojejunostomy using a running 2-0 silk suture along the mesentery to the anastomosis.  This created a biliopancreatic limb of 60 cm and a Rafiq limb of 150 cm.  We then proceeded with an on table esophagogastroscopy.  The staple lines in the anastomosis were inspected and held under water laparoscopically to ensure there were no leaks.  All staple lines found to be hemostatic and there was no leaks identified.    The Isa liver retractor was removed under direct visualization.  The remainder of our ports were removed and the pneumoperitoneum was released.  Skin was closed using subcuticular 4-0 Monocryl.  Dermabond was placed over the wounds.    The patient was awakened from general anesthetic, and was taken to the recovery room in stable condition.    Sponge and needle counts were correct at the end of the case.     Specimen: none    EBL: 10mL    Dispo: stable, extubated, to PACU    Tom Gould M.D.  [facility]  [phone]

## 2024-03-05 ENCOUNTER — THERAPY VISIT (OUTPATIENT)
Dept: OCCUPATIONAL THERAPY | Facility: CLINIC | Age: 69
End: 2024-03-05
Payer: COMMERCIAL

## 2024-03-05 ENCOUNTER — OFFICE VISIT (OUTPATIENT)
Dept: ORTHOPEDICS | Facility: CLINIC | Age: 69
End: 2024-03-05
Payer: COMMERCIAL

## 2024-03-05 DIAGNOSIS — M20.012 MALLET FINGER OF LEFT HAND: Primary | ICD-10-CM

## 2024-03-05 PROCEDURE — 99213 OFFICE O/P EST LOW 20 MIN: CPT | Performed by: STUDENT IN AN ORGANIZED HEALTH CARE EDUCATION/TRAINING PROGRAM

## 2024-03-05 PROCEDURE — 97760 ORTHOTIC MGMT&TRAING 1ST ENC: CPT | Mod: GO | Performed by: OCCUPATIONAL THERAPIST

## 2024-03-05 PROCEDURE — 97165 OT EVAL LOW COMPLEX 30 MIN: CPT | Mod: GO | Performed by: OCCUPATIONAL THERAPIST

## 2024-03-05 NOTE — PROGRESS NOTES
Sports Medicine Clinic           ASSESSMENT and PLAN:     Guerrero was seen today for recheck.    Diagnoses and all orders for this visit:    Mallet finger of left hand  Mallet finger of the left 5th digit without associated fracture. ~8 weeks of continuous extension splinting. Today extension is improved from previous but still has a lag. We discussed continued immobilization vs immobilization and night and hand therapy. He is okay with the amount of extensor lag given which finger it is, but is interested in hand therapy to work on range of motion and possible custom DIP splint that he can wear while typing.   -     Hand Therapy Referral; Future  -     consider custom DIP splint for typing  -     wear splint for 3 more weeks at night, can try oval 8 splint if more comfortable  -     protect if playing sports or active for an additional 4 weeks  -     follow up as needed    Return sooner if develops new or worsening symptoms.    Options for treatment and/or follow-up care were reviewed with the patient was actively involved in the decision making process. Patient verbalized understanding and was in agreement with the plan.    Juli Philip MD, [facility]  Primary Care Sports Medicine           SUBJECTIVE       Guerrero Ibrahim is a 68 year old male presenting to clinic today for follow up of left 5th mallet finger.    Patient was last seen on 1/23/24.    Since their last visit he has continued to keep his finger immobilized. No issues other than awkward while typing.       PMH, Medications and Allergies were reviewed and updated as needed.    ROS:  As noted above otherwise negative.    Patient Active Problem List   Diagnosis    Solar lentiginosis    Diffuse photodamage of skin    Skin cancer screening    Verrucous keratosis    Multiple melanocytic nevi    BPH (benign prostatic hyperplasia)    Anemia    Mixed hyperlipidemia    Vitamin B deficiency    Tubular adenoma of colon    AK (actinic keratosis)    Seborrheic keratosis     Neoplasm of unspecified behavior of bone, soft tissue, and skin       Current Outpatient Medications   Medication Sig Dispense Refill    finasteride (PROSCAR) 5 MG tablet Take 1 tablet (5 mg) by mouth daily 90 tablet 3    methylcellulose (CITRUCEL) powder       tamsulosin (FLOMAX) 0.4 MG capsule TAKE 1 CAPSULE (0.4 MG) BY MOUTH DAILY 90 capsule 3    vitamin D3 (CHOLECALCIFEROL) 2000 units (50 mcg) tablet Take 1 tablet by mouth daily              OBJECTIVE:       Vitals: There were no vitals filed for this visit.  BMI: There is no height or weight on file to calculate BMI.    Gen:  Well nourished and in no acute distress  HEENT: Extraocular movement intact  Neck: Supple  Pulm:  Breathing Comfortably. No increased respiratory effort.  Psych: Euthymic. Appropriately answers questions    MSK:   Radial deivation of the distal phalanx of the left 5th digit. Minimally tender to palpation over the DIP, no other tenderness throughout the finger. Able to extend the finger but does not get to full extension, able to flex but not fully due to stiffness of the finger in general.

## 2024-03-05 NOTE — PROGRESS NOTES
OCCUPATIONAL THERAPY EVALUATION  Type of Visit: Evaluation    See electronic medical record for Abuse and Falls Screening details.    Subjective      Presenting condition or subjective complaint: mallet fingerSmall finger stiffness  Date of onset: 01/13/24 (3/5/2024 order date)    Relevant medical history: Sleep disorder like apnea No past medical history on file.  Dates & types of surgery:    Past Surgical History:   Procedure Laterality Date    APPENDECTOMY  1959    COLONOSCOPY  2003,2008,2013, 2019    ORTHOPEDIC SURGERY Right     trigger finger x2 in 2000's    SALIVARY GLAND SURGERY      removal 1998    wisdom teeth       Prior diagnostic imaging/testing results: X-ray     Prior therapy history for the same diagnosis, illness or injury:        Prior Level of Function  Transfers:   Ambulation:   ADL:   IADL:     Living Environment  Social support: With a significant other or spouse   Type of home: House; Multi-level   Stairs to enter the home: Yes 6 Is there a railing: Yes   Ramp: No   Stairs inside the home: Yes 30 Is there a railing: Yes   Help at home: None  Equipment owned:       Employment: Yes   Hobbies/Interests: walking reading    Patient goals for therapy:      Pain assessment:      Objective   Right hand dominant  Patient reports symptoms of pain, stiffness/loss of motion, weakness/loss of strength, and edema    Pain Level (Scale 0-10):   3/5/2024   At Rest 0/10   With Use 1/10     Pain Description:  Date 3/5/2024   Location small finger   Pain Quality Aching   Frequency intermittent     Pain is worst  daytime   Exacerbated by  Pressure, tenderness   Relieved by rest   Progression Unchanged     Edema (Circumference measured in cm)   3/5/2024 3/5/2024   Small Finger R L   P1 6.8 6.1   PIP 5.9 5.8   P2 5.6 5.7     Sensation  WNL throughout all nerve distributions; per patient report    ROM  Small Finger 3/5/2024 3/5/2024   AROM (PROM) R L  Hook fist   MCP 0/93 0/   PIP 0/92 0/   DIP 0/64 -9  pre/  -16 post   KWON       Strength  Contraindicated at this time      Assessment & Plan   CLINICAL IMPRESSIONS  Medical Diagnosis: Mallet finger of left SF hand    Treatment Diagnosis: small finger stiffness    Impression/Assessment: Pt is a 68 year old male presenting to Occupational Therapy due to fall.  The following significant findings have been identified: Impaired ROM, Impaired strength, and Pain.  These identified deficits interfere with their ability to perform self care tasks, work tasks, recreational activities, household chores, driving , and meal planning and preparation as compared to previous level of function.     Clinical Decision Making (Complexity):  Assessment of Occupational Performance: 5 or more Performance Deficits  Occupational Performance Limitations: bathing/showering, toileting, dressing, feeding, functional mobility, hygiene and grooming, driving and community mobility, health management and maintenance, home establishment and management, meal preparation and cleanup, shopping, sleep, work, and leisure activities  Clinical Decision Making (Complexity): Low complexity    PLAN OF CARE  Treatment Interventions:  Modalities:  US and Paraffin  Therapeutic Exercise:  AROM, AAROM, PROM, Tendon Gliding, Blocking, Reverse Blocking, Place and Hold, Contract Relax, Extensor Tracking, Isotonics, Isometrics and Stabilization  Neuromuscular re-education:  Nerve Gliding, Coordination/Dexterity, Sensory re-education, Desensitization, Kinesthetic Training, Proprioceptive Training, Kinesiotaping, Strain Counter Strain, Isometrics, Stabilization   Manual Techniques:  Coordination/Dexterity, Joint mobilization, Scar mobilization, Friction massage, Myofascial release, and Manual edema mobilization  Orthotic Fabrication:  Static and Finger based  Self Care:  Self Care Tasks, Ergonomic Considerations, and Work Tasks    Long Term Goals   OT Goal 2  Goal Identifier: Household IADLs - carrying a shopping  bag  Goal Description: No difficulty carrying a shopping bag equal to or greater than 10lb  Rationale: In order to maximize safety and independence with performance of self-care activities  Target Date: 06/05/24      Frequency of Treatment: 2x/month  Duration of Treatment: 3 months     Recommended Referrals to Other Professionals:   Education Assessment: Learner/Method: Patient;Demonstration  Education Comments: PTRX on phone     Risks and benefits of evaluation/treatment have been explained.   Patient/Family/caregiver agrees with Plan of Care.     Evaluation Time:    OT Eval, Low Complexity Minutes (60798): 26       Signing Clinician: CHIN Gonzalez [facility]                                                                                   OUTPATIENT OCCUPATIONAL THERAPY      PLAN OF TREATMENT FOR OUTPATIENT REHABILITATION   Patient's Last Name, First Name, JENNYGuerrero Small YOB: 1955   Provider's Name   [facility]   Medical Record No.  [id number]     Onset Date: 01/13/24 (3/5/2024 order date) Start of Care Date: 03/05/24     Medical Diagnosis:  Mallet finger of left SF hand      OT Treatment Diagnosis:  small finger stiffness Plan of Treatment  Frequency/Duration:2x/month/3 months    Certification date from 03/05/24   To 06/05/24        See note for plan of treatment details and functional goals     Nina Cunha OT                         I CERTIFY THE NEED FOR THESE SERVICES FURNISHED UNDER        THIS PLAN OF TREATMENT AND WHILE UNDER MY CARE     (Physician attestation of this document indicates review and certification of the therapy plan).              Referring Provider:  Juli Philip    Initial Assessment  See Epic Evaluation- 03/05/24

## 2024-03-05 NOTE — LETTER
3/5/2024      RE: Guerrero Ibrahim  [address]  [place] [address]     Dear Colleague,    Thank you for referring your patient, Guerrero Ibrahim, to the [facility]IC[facility] [place]. Please see a copy of my visit note below.      Sports Medicine Clinic           ASSESSMENT and PLAN:     Guerrero was seen today for recheck.    Diagnoses and all orders for this visit:    Mallet finger of left hand  Mallet finger of the left 5th digit without associated fracture. ~8 weeks of continuous extension splinting. Today extension is improved from previous but still has a lag. We discussed continued immobilization vs immobilization and night and hand therapy. He is okay with the amount of extensor lag given which finger it is, but is interested in hand therapy to work on range of motion and possible custom DIP splint that he can wear while typing.   -     Hand Therapy Referral; Future  -     consider custom DIP splint for typing  -     wear splint for 3 more weeks at night, can try oval 8 splint if more comfortable  -     protect if playing sports or active for an additional 4 weeks  -     follow up as needed    Return sooner if develops new or worsening symptoms.    Options for treatment and/or follow-up care were reviewed with the patient was actively involved in the decision making process. Patient verbalized understanding and was in agreement with the plan.    Juli Philip MD, [facility]  Primary Care Sports Medicine           SUBJECTIVE       Guerrero Ibrahim is a 68 year old male presenting to clinic today for follow up of left 5th mallet finger.    Patient was last seen on 1/23/24.    Since their last visit he has continued to keep his finger immobilized. No issues other than awkward while typing.       PMH, Medications and Allergies were reviewed and updated as needed.    ROS:  As noted above otherwise negative.    Patient Active Problem List   Diagnosis     Solar lentiginosis     Diffuse photodamage  of skin     Skin cancer screening     Verrucous keratosis     Multiple melanocytic nevi     BPH (benign prostatic hyperplasia)     Anemia     Mixed hyperlipidemia     Vitamin B deficiency     Tubular adenoma of colon     AK (actinic keratosis)     Seborrheic keratosis     Neoplasm of unspecified behavior of bone, soft tissue, and skin       Current Outpatient Medications   Medication Sig Dispense Refill     finasteride (PROSCAR) 5 MG tablet Take 1 tablet (5 mg) by mouth daily 90 tablet 3     methylcellulose (CITRUCEL) powder        tamsulosin (FLOMAX) 0.4 MG capsule TAKE 1 CAPSULE (0.4 MG) BY MOUTH DAILY 90 capsule 3     vitamin D3 (CHOLECALCIFEROL) 2000 units (50 mcg) tablet Take 1 tablet by mouth daily              OBJECTIVE:       Vitals: There were no vitals filed for this visit.  BMI: There is no height or weight on file to calculate BMI.    Gen:  Well nourished and in no acute distress  HEENT: Extraocular movement intact  Neck: Supple  Pulm:  Breathing Comfortably. No increased respiratory effort.  Psych: Euthymic. Appropriately answers questions    MSK:   Radial deivation of the distal phalanx of the left 5th digit. Minimally tender to palpation over the DIP, no other tenderness throughout the finger. Able to extend the finger but does not get to full extension, able to flex but not fully due to stiffness of the finger in general.             Again, thank you for allowing me to participate in the care of your patient.      Sincerely,    Juli Philip MD

## 2024-03-07 ENCOUNTER — TELEPHONE (OUTPATIENT)
Dept: GASTROENTEROLOGY | Facility: CLINIC | Age: 69
End: 2024-03-07
Payer: COMMERCIAL

## 2024-03-14 ENCOUNTER — THERAPY VISIT (OUTPATIENT)
Dept: OCCUPATIONAL THERAPY | Facility: CLINIC | Age: 69
End: 2024-03-14
Payer: COMMERCIAL

## 2024-03-14 DIAGNOSIS — M20.012 MALLET FINGER OF LEFT HAND: Primary | ICD-10-CM

## 2024-03-14 PROCEDURE — 97763 ORTHC/PROSTC MGMT SBSQ ENC: CPT | Mod: GO | Performed by: OCCUPATIONAL THERAPIST

## 2024-03-16 ENCOUNTER — OFFICE VISIT (OUTPATIENT)
Dept: URGENT CARE | Facility: URGENT CARE | Age: 69
End: 2024-03-16
Payer: COMMERCIAL

## 2024-03-16 VITALS
OXYGEN SATURATION: 97 % | SYSTOLIC BLOOD PRESSURE: 161 MMHG | BODY MASS INDEX: 25.9 KG/M2 | TEMPERATURE: 97.7 F | HEIGHT: 67 IN | DIASTOLIC BLOOD PRESSURE: 91 MMHG | RESPIRATION RATE: 18 BRPM | HEART RATE: 70 BPM | WEIGHT: 165 LBS

## 2024-03-16 DIAGNOSIS — L03.211 CELLULITIS OF FACE: Primary | ICD-10-CM

## 2024-03-16 PROCEDURE — 99213 OFFICE O/P EST LOW 20 MIN: CPT | Performed by: PHYSICIAN ASSISTANT

## 2024-03-16 RX ORDER — SACCHAROMYCES BOULARDII 250 MG
250 CAPSULE ORAL 2 TIMES DAILY
Qty: 28 CAPSULE | Refills: 0 | Status: SHIPPED | OUTPATIENT
Start: 2024-03-16 | End: 2024-03-30

## 2024-03-18 ENCOUNTER — TELEPHONE (OUTPATIENT)
Dept: GASTROENTEROLOGY | Facility: CLINIC | Age: 69
End: 2024-03-18
Payer: COMMERCIAL

## 2024-03-18 NOTE — TELEPHONE ENCOUNTER
Caller: Guerrero Ibrahim   Reason for Reschedule/Cancellation (please be detailed, any staff messages or encounters to note?):     PER PT -- CHANGE DATE       Prior to reschedule please review:  Ordering Provider:Arian Paredes MD i    Sedation Determined: MODERATE   Does patient have any ASC Exclusions, please identify?:     Y - LISA       Notes on Cancelled Procedure:  Procedure:Lower Endoscopy [Colonoscopy]   Date: 03/19/2024  Location:[facility]; [address] [place] [address]  Surgeon: ALTAGRACIA THORNTON         Rescheduled: YES   Procedure: Lower Endoscopy [Colonoscopy]  Date: 04/29/2024  Location:[facility]; [address] [place] [address]  Surgeon: ESTELA   Sedation Level Scheduled  MODERATE          Reason for Sedation Level PER ORDER  Prep/Instructions updated and sent: MYCHART     Does patient need PAC or Pre -Op Rescheduled? : N       Send In - basket message to Panc - Vish Pool if EUS procedure is canceled or rescheduled: [ N/A, YES or NO] N/A

## 2024-03-21 ENCOUNTER — THERAPY VISIT (OUTPATIENT)
Dept: OCCUPATIONAL THERAPY | Facility: CLINIC | Age: 69
End: 2024-03-21
Payer: COMMERCIAL

## 2024-03-21 DIAGNOSIS — M20.012 MALLET FINGER OF LEFT HAND: Primary | ICD-10-CM

## 2024-03-21 PROCEDURE — 97763 ORTHC/PROSTC MGMT SBSQ ENC: CPT | Mod: GO | Performed by: OCCUPATIONAL THERAPIST

## 2024-03-21 PROCEDURE — 97110 THERAPEUTIC EXERCISES: CPT | Mod: GO | Performed by: OCCUPATIONAL THERAPIST

## 2024-03-25 RX ORDER — LIDOCAINE 40 MG/G
CREAM TOPICAL
Status: CANCELLED | OUTPATIENT
Start: 2024-03-25

## 2024-03-25 RX ORDER — ONDANSETRON 2 MG/ML
4 INJECTION INTRAMUSCULAR; INTRAVENOUS
Status: CANCELLED | OUTPATIENT
Start: 2024-03-25

## 2024-04-01 ENCOUNTER — VIRTUAL VISIT (OUTPATIENT)
Dept: INTERNAL MEDICINE | Facility: CLINIC | Age: 69
End: 2024-04-01
Payer: COMMERCIAL

## 2024-04-01 DIAGNOSIS — U07.1 COVID-19 VIRUS INFECTION: Primary | ICD-10-CM

## 2024-04-01 PROCEDURE — 99213 OFFICE O/P EST LOW 20 MIN: CPT | Mod: 95 | Performed by: PEDIATRICS

## 2024-04-01 NOTE — PATIENT INSTRUCTIONS
Thank you for visiting the Primary Care Center today at the [facility]! The following is some information about our clinic:     Primary Care Center Frequently-Asked Questions    (1) How do I schedule appointments at the [facility]?     Primary Care--to schedule or make changes to an existing appointment, please call our primary care line at [phone].    Labs--to schedule a lab appointment at the [facility] you can use [employer] or call [phone]. If you have a [place] location that is closer to home, you can reach out to that location for scheduling options.     Imaging--if you need to schedule a CT, X-ray, MRI, ultrasound, or other imaging study you can call [phone] to schedule at the [facility] or any other [facility] imaging location.     Referrals--if a referral to another specialty was ordered you can expect a phone call from their scheduling team. If you have not heard from them in a week, please call us or send us a [employer] message to check the status or get a scheduling number. Please note that this only applies to internal [facility] referrals. If the referral is external you would need to contact their office for scheduling.     (2) I have a question about my visit, who do I contact?     You can call us at the primary care line at [phone] to ask questions about your visit. You can also send a secure message through [employer], which is reviewed by clinic staff. Please note that [employer] messages have a twenty-four to forty-eight business hour turnaround time and should not be used for urgent concerns.    (3) How will I get the results of my tests?    If you are signed up for [employer] all tests will be released to you within twenty-four hours of resulting. Please allow three to five days for your doctor to review your results and place a note interpreting the results. If you do not have [employer] you will receive your  results through mail seven to ten business days following the return of the tests. Please note that if there should be any urgent or concerning results that your doctor or their registered nurse will reach out to you the same day as the tests come back. If you have follow up questions about your results or would like to discuss the results in detail please schedule a follow up with your provider either in person or virtually.     (4) How do I get refills of my prescriptions?     You should always first contact your pharmacy for refills of your medications. If submitting a refill request on [employer], please be sure to submit the request only once--repeat requests can cause delays in refill. If you are requesting a NEW medication or a medication related to new symptoms you will need to schedule an appointment with a provider prior to approval. Please note: Routine medication refills have up to one to three business day turnaround whereas controlled substances refills have up to five to seven business day turnaround.    (5) I have new symptoms, what do I do?     If you are having an immediate medical emergency, you should dial 911 for assistance.   For anything urgent that needs to be seen within a few hours to one day you should visit a local urgent care for assistance.  For non-urgent symptoms that need to be seen within a few days to a week you can schedule with an available provider in primary care by going to [employer] or calling [phone].   If you are not sure how serious your symptoms are or you would like to receive medical advice you can always call [phone] to speak with a triage nurse.

## 2024-04-01 NOTE — PROGRESS NOTES
"Guerrero is a 68 year old who is being evaluated via a billable video visit.    How would you like to obtain your AVS? [employer]  If the video visit is dropped, the invitation should be resent by: Send to e-mail at: [email or web address]  Will anyone else be joining your video visit? No      Assessment & Plan     COVID  Tested positive yesterday  First symptom Friday night had a tickle in the back of this throat.  Symptoms include sore throat, runny nose, and cough. Wife had fever.   This is the first COVID infection for him. he has had 7 immunizations.       ASSESSMENT and PLAN: Seems to be doing well. Discussed pros and cons of medication. Only med of concern is tamsulosin - he has been using for 5 years and needed a cath.  He elected to not take the paxlovid due to timing.  Discussed danger signs of COVID, strategies for mitigating (sleep, fluids, etc). Discussed timing of contagion and immunization.    Call nurse line PRN questions.        Sent via myChart      Hello,  I hope you feel better soon.  I recommend the expectorant guaifenesin ... preferably the short-acting version, which is more effective than the long-acting. Try to find the guaifenesin by itself, without a decongestant or cough suppressant.  Try to have an early bedtime and plenty of fluids.  If you have any questions or concerns, please call my office (at [phone]) and ask to speak with my nurse.   Abdulaziz Cason MD  Internal Medicine & Pediatrics  [facility]       I spent a total of 24 minutes on the day of the visit.   Time spent by me doing chart review, history and exam, documentation and further activities per the note      BMI  Estimated body mass index is 25.84 kg/m  as calculated from the following:    Height as of 3/16/24: 1.702 m (5' 7\").    Weight as of 3/16/24: 74.8 kg (165 lb).             No follow-ups on file.    Subjective   Guerrero is a 68 year old, presenting for the following health " issues:  RECHECK and COVID positive    Video Start Time: 1:57 PM    History of Present Illness       Reason for visit:  Tested positive for Covid yesterday  Symptom onset:  1-3 days ago  Symptoms include:  Congestion, cough  Symptom intensity:  Moderate  Symptom progression:  Staying the same  Had these symptoms before:  No  What makes it worse:  No  What makes it better:  No    He eats 2-3 servings of fruits and vegetables daily.He consumes 0 sweetened beverage(s) daily.He exercises with enough effort to increase his heart rate 20 to 29 minutes per day.  He exercises with enough effort to increase his heart rate 7 days per week.   He is taking medications regularly.           Objective           Vitals:  No vitals were obtained today due to virtual visit.    Physical Exam  Constitutional:       Appearance: Normal appearance.   HENT:      Head: Normocephalic.   Eyes:      General: No scleral icterus.        Right eye: No discharge.         Left eye: No discharge.      Extraocular Movements: Extraocular movements intact.   Pulmonary:      Effort: No respiratory distress.      Comments: occasional raspy cough  Skin:     Comments: No apparent abnormalities in exposed skin   Neurological:      Mental Status: He is alert.   Psychiatric:         Attention and Perception: Attention normal.         Mood and Affect: Mood normal.         Speech: Speech normal.         Behavior: Behavior normal.                  Video-Visit Details    Type of service:  Video Visit   Video End Time:2:10 PM  Originating Location (pt. Location): Home    Distant Location (provider location):  Off-site  Platform used for Video Visit: Haja  Signed Electronically by: Michael Cason MD

## 2024-04-09 ENCOUNTER — THERAPY VISIT (OUTPATIENT)
Dept: OCCUPATIONAL THERAPY | Facility: CLINIC | Age: 69
End: 2024-04-09
Payer: COMMERCIAL

## 2024-04-09 DIAGNOSIS — M20.012 MALLET FINGER OF LEFT HAND: Primary | ICD-10-CM

## 2024-04-09 PROCEDURE — 97110 THERAPEUTIC EXERCISES: CPT | Mod: GO | Performed by: OCCUPATIONAL THERAPIST

## 2024-04-09 PROCEDURE — 97018 PARAFFIN BATH THERAPY: CPT | Mod: GO | Performed by: OCCUPATIONAL THERAPIST

## 2024-04-12 NOTE — PROGRESS NOTES
"SUBJECTIVE:  Guerrero Ibrahim is a 68 year old male who presents to the clinic today for a rash.  Onset of rash was 2 day(s) ago.   Rash is gradual onset.  Location of the rash: nose.  Quality/symptoms of rash: red   Symptoms are mild and rash seems to be stable.  Previous history of a similar rash? No  Recent exposure history: sutures    Associated symptoms include: none    No past medical history on file.  Current Outpatient Medications   Medication Sig Dispense Refill    finasteride (PROSCAR) 5 MG tablet Take 1 tablet (5 mg) by mouth daily 90 tablet 3    tamsulosin (FLOMAX) 0.4 MG capsule TAKE 1 CAPSULE (0.4 MG) BY MOUTH DAILY 90 capsule 3    vitamin D3 (CHOLECALCIFEROL) 2000 units (50 mcg) tablet Take 1 tablet by mouth daily      methylcellulose (CITRUCEL) powder        Social History     Tobacco Use    Smoking status: Never    Smokeless tobacco: Never    Tobacco comments:     never smoked   Substance Use Topics    Alcohol use: Yes     Comment: 1 beer or glass of wine per week       ROS:  Review of systems negative except as stated above.    EXAM:   BP (!) 161/91   Pulse 70   Temp 97.7  F (36.5  C) (Temporal)   Resp 18   Ht 1.702 m (5' 7\")   Wt 74.8 kg (165 lb)   SpO2 97%   BMI 25.84 kg/m    GENERAL: alert, no acute distress.  SKIN: subtle erythema at site, sensitive  GENERAL APPEARANCE: healthy, alert and no distress  EYES: EOMI,  PERRL, conjunctiva clear  NECK: supple, non-tender to palpation, no adenopathy noted  RESP: lungs clear to auscultation - no rales, rhonchi or wheezes  CV: regular rates and rhythm, normal S1 S2, no murmur noted    ASSESSMENT:  (L03.211) Cellulitis of face  (primary encounter diagnosis)  Plan: amoxicillin-clavulanate (AUGMENTIN) 875-125 MG         tablet, saccharomyces boulardii (FLORASTOR) 250        MG capsule        Red flags and emergent follow up discussed, and understood by patient  Follow up with PCP in 2 days  Return if worsenin        "

## 2024-04-25 NOTE — TELEPHONE ENCOUNTER
"Pre Assessment RN Review    Focused Assessments      LISA Hx  Estimated body mass index is 25.84 kg/m  as calculated from the following:    Height as of 3/16/24: 1.702 m (5' 7\").    Weight as of 3/16/24: 74.8 kg (165 lb).     Patient has reported / documented history LISA and reports he does use a a device for sleep.     Device: CPAP    Severity Assessment    Sleep Study:   Diagnosis/Severity: Moderate    AHI: 21.3    Scheduling Status & Recommendations    Patients APPROVED location: [facility] d/t AHI is less than 30.      Erin Karimi RN    "

## 2024-04-26 ENCOUNTER — TELEPHONE (OUTPATIENT)
Dept: GASTROENTEROLOGY | Facility: CLINIC | Age: 69
End: 2024-04-26
Payer: COMMERCIAL

## 2024-04-26 NOTE — CONFIDENTIAL NOTE
Caller: Guerrero Ibrahim    Reason for Reschedule/Cancellation   (please be detailed, any staff messages or encounters to note?): Provider paternity leave.      Prior to reschedule please review:  Ordering Provider: Arian Paredes  Sedation Determined: Moderate, per order  Does patient have any ASC Exclusions, please identify?:  LISA. [facility]-ASC appropriate, would require review for consideration at -ASC      Notes on Cancelled Procedure:  Procedure: Lower Endoscopy [Colonoscopy]   Date: 4/29/2024  Location: [facility]; [address] [place] [address], [place] [address]  Surgeon: Dr. Nunez      Rescheduled: No, Writer will be attempting to work patient into May 23rd schedule.  Alternatively, patient will be available after June 17.       Did you cancel or rescheduled an EUS procedure? No.

## 2024-05-07 ENCOUNTER — THERAPY VISIT (OUTPATIENT)
Dept: OCCUPATIONAL THERAPY | Facility: CLINIC | Age: 69
End: 2024-05-07
Payer: COMMERCIAL

## 2024-05-07 DIAGNOSIS — M20.012 MALLET FINGER OF LEFT HAND: Primary | ICD-10-CM

## 2024-05-07 PROCEDURE — 97110 THERAPEUTIC EXERCISES: CPT | Mod: GO | Performed by: OCCUPATIONAL THERAPIST

## 2024-05-07 NOTE — PROGRESS NOTES
"   05/07/24 0500   Appointment Info   Treating Provider Nina Cunha, OTR/L   Total/Authorized Visits 6 (POC)   Visits Used 5   Medical Diagnosis Mallet finger of left SF hand   OT Tx Diagnosis small finger stiffness   Quick Add  Certification   Progress Note/Certification   Start Of Care Date 03/05/24   Onset of Illness/Injury or Date of Surgery 01/13/24  (3/5/2024 order date)   Therapy Frequency 2x/month   Predicted Duration 3 months   Certification date from 03/05/24   Certification date to 06/05/24   Progress Note Due Date 05/03/24   Progress Note Completed Date 03/05/24   Goals   OT Goals 2   OT Goal 2   Goal Identifier Household IADLs - carrying a shopping bag   Goal Description No difficulty carrying a shopping bag equal to or greater than 10lb   Rationale In order to maximize safety and independence with performance of self-care activities   Goal Progress Goal met   Target Date 06/05/24   Date Met 05/07/24   Subjective Report   Subjective Report \"Great. If I had to evaluate it, I'd say 98%\"   Objective Measures   Objective Measures Hand Obj Measures   Hand Objective Measures ROM;Strength   ROM Hand ROM   Strength    Hand ROM    Small MP 88   Small PIP 93   Small DIP -10/63    (measured in pounds)    Average Strength R: 94 L: 64   Treatment Interventions (OT)   Interventions Therapeutic Procedure/Exercise;Orthotics   Therapeutic Procedure/Exercise   Therapeutic Procedure: strength, endurance, ROM, flexibillity minutes (83277) 17   Therapeutic Procedures Ther Proc 2   PTRx Ther Proc 1 Finger Passive Range of Motion Hook/IP Joint Flexion   PTRx Ther Proc 1 - Details 5 stretches - 10-15 second hold per stretch 1-2x/day. Graded up for HEP.   PTRx Ther Proc 2 Finger Active Range of Motion Tendon Glides Fist Series   PTRx Ther Proc 2 - Details 15 reps in clinic cueing to pause in hook position. Tapered to 1-2x/day d/t improvement increased functional use of hand particularly with typing and daily tasks "   PTRx Ther Proc 3 Hand Strengthening Gripping   PTRx Ther Proc 3 - Details 2 sets of 10 reps 3x/week. Use a rolled-up washcloth or a stress ball. Rest for 30 seconds in between sets. Graded up for HEP   Skilled Intervention Advanced to strengthening, functional PROM for hook fist.   Patient Response/Progress Good   Orthotics   Skilled Intervention Discontinue entirely   Education   Learner/Method Patient;Demonstration   Education Comments PTRX on phone   Plan   Home program see flowsheet/PTRX   Updates to plan of care Strengthening, d/c     Upper Extremity Functional Index Score:  SCORE:   Column Totals: /80: 78   (A lower score indicates greater disability.)        DISCHARGE  Reason for Discharge: Patient has met all goals.    Discharge Plan: Patient to continue home program.    Referring Provider:  Juli Philip

## 2024-05-13 NOTE — TELEPHONE ENCOUNTER
Rescheduled: Yes,   Procedure: Lower Endoscopy [Colonoscopy]    Date: 8/1/2024   Location: [facility]; [facility] [address] [place] [address]    Surgeon: Srinath   Sedation Level Scheduled  Moderate ,  Reason for Sedation Level Order   Instructions updated and sent: [place]     Does patient need PAC or Pre -Op Rescheduled? : No       Did you cancel or rescheduled an EUS procedure? No.

## 2024-05-14 NOTE — TELEPHONE ENCOUNTER
Caller: Guerrero     Reason for Reschedule/Cancellation   (please be detailed, any staff messages or encounters to note?): Change location      Prior to reschedule please review:  Ordering Provider: Arian Paredes MD  Sedation Determined: Mod  Does patient have any ASC Exclusions, please identify?: Y, LISA      Notes on Cancelled Procedure:  Procedure: Lower Endoscopy [Colonoscopy]   Date: 08/21/2024  Location: [facility]; [address] [place], [place] [id number]   Surgeon: Srinath      Rescheduled: Yes,   Procedure: Lower Endoscopy [Colonoscopy]    Date: 09/13/20224   Location: [facility]; [address], [address], [place] [address]    Surgeon: Renee   Sedation Level Scheduled  Mod ,  Reason for Sedation Level Order   Instructions updated and sent: y     Does patient need PAC or Pre -Op Rescheduled? : n       Did you cancel or rescheduled an EUS procedure? No.

## 2024-05-14 NOTE — TELEPHONE ENCOUNTER
Caller: Guerrero    Reason for Reschedule/Cancellation   (please be detailed, any staff messages or encounters to note?): Offered earlier date      Prior to reschedule please review:  Ordering Provider:     Arian Paredes MD     Sedation Determined: CS  Does patient have any ASC Exclusions, please identify?: Y, LISA      Notes on Cancelled Procedure:  Procedure: Lower Endoscopy [Colonoscopy]   Date: 09/13/2024   Location: [facility]; [address], [place] [place] [address]   Surgeon: Renee      Rescheduled: Yes,   Procedure: Lower Endoscopy [Colonoscopy]    Date: 05/23/2024   Location: [facility]; [address], [address] [place] [address]    Surgeon: David   Sedation Level Scheduled  CS ,  Reason for Sedation Level Order   Instructions updated and sent: Y     Does patient need PAC or Pre -Op Rescheduled? : N        Did you cancel or rescheduled an EUS procedure? No.

## 2024-05-15 NOTE — TELEPHONE ENCOUNTER
Attempted to contact patient in order to complete pre assessment questions.     No answer. Left message on work number to return call to [phone] option 4  -home number was incorrect [facility]    Callback required communication sent via [employer].    Rachel Joaquin RN  Endoscopy Procedure Pre Assessment RN         
Attempted to contact patient in order to complete pre assessment questions.     No answer. Left message to return call to [phone] option 4    Callback required communication sent via [employer].    Rachel Joaquin RN  Endoscopy Procedure Pre Assessment RN         
Pre assessment completed for upcoming procedure.   (Please see previous telephone encounter notes for complete details)      Procedure details:    Arrival time and facility location reviewed.    Pre op exam needed? N/A    Designated  policy reviewed. Instructed to have someone stay 6  hours post procedure.       Medication review:    Pt will HOLD Citrucel for 7 days prior to procedure      Prep for procedure:     Procedure prep instructions reviewed.        Any additional information needed:  N/A      Patient  verbalized understanding and had no questions or concerns at this time.      Rachel Joaquin RN  Endoscopy Procedure Pre Assessment RN  [phone] option 4  
Pre assessment completed for upcoming procedure.   (Please see previous telephone encounter notes for complete details)    Patient  returned call.       Procedure details:    Arrival time and facility location reviewed.    Pre op exam needed? N/A    Designated  policy reviewed. Instructed to have someone stay 6  hours post procedure.       Medication review:    Medications reviewed. Please see supporting documentation below. Holding recommendations discussed (if applicable).       Prep for procedure:     Procedure prep instructions reviewed.        Any additional information needed:  N/A      Patient  verbalized understanding and had no questions or concerns at this time.      Liliane Hernandez RN  Endoscopy Procedure Pre Assessment RN  [phone] option 4  
Pre assessment completed for upcoming procedure.   (Please see previous telephone encounter notes for complete details)    Patient returned call.       Procedure details:    Arrival time and facility location reviewed.    Pre op exam needed? N/A    Designated  policy reviewed. Instructed to have someone stay 6  hours post procedure.       Medication review:    Medications reviewed. Please see supporting documentation below. Holding recommendations discussed (if applicable).   NSAID medication(s): Ibuprofen (Advil, Motrin): HOLD 1 day before procedure.  Citrucel/Fiber supplement: HOLD for 7 days before procedure      Prep for procedure:     Patient stated they have already reviewed the bowel prep instructions and had no questions. NPO instructions reviewed.    Patient was instructed to call if any questions or concerns arise.       Any additional information needed:  N/A      Patient  verbalized understanding and had no questions or concerns at this time.      Armida Sanchez RN  Endoscopy Procedure Pre Assessment RN  [phone] option 4  
Pre visit planning completed.      Procedure details:    Patient scheduled for Colonoscopy  on 3/19/2024.     Arrival time: 1015. Procedure time 1115    Pre op exam needed? N/A    Facility location: [facility]; [address] [place] [address], [address] Floor, [place] [address]    Sedation type: Conscious sedation     Indication for procedure: Special screening for malignant neoplasms, colon [Z12.11]       Chart review:     Electronic implanted devices? No    Recent diagnosis of diverticulitis within the last 6 weeks? No    Diabetic? No    Diabetic medication HOLDING recommendations: (if applicable)  Oral diabetic medications: N/A  Diabetic injectables: N/A  Insulin: N/A      Medication review:    Anticoagulants? No    NSAIDS? Yes.  Ibuprofen (Advil, Motrin).  Holding interval of 1 day before procedure.    Other medication HOLDING recommendations:  N/A      Prep for procedure:     Bowel prep recommendation: Standard Miralax   Due to:  standard bowel prep.    Prep instructions sent via [employer]       Rachel Joaquin RN  Endoscopy Procedure Pre Assessment RN  [phone] option 4  
Pre visit planning completed.      Procedure details:    Patient scheduled for Colonoscopy  on 4/29/2024.     Arrival time: 1130. Procedure time 1230    Facility location: [facility]; [address] [place] [address], [address]rd Floor, [place] [address]. Check in location: Main entrance at registration desk.    Sedation type: Conscious sedation     Pre op exam needed? N/A    Indication for procedure: Special screening for malignant neoplasms, colon [Z12.11]       Chart review:     Electronic implanted devices? No    Recent diagnosis of diverticulitis within the last 6 weeks? No    Diabetic? No      Medication review:    Anticoagulants? No    NSAIDS? Yes.  Ibuprofen (Advil, Motrin).  Holding interval of 1 day before procedure.    Other medication HOLDING recommendations:  N/A      Prep for procedure:     Bowel prep recommendation: Standard Miralax  Due to: standard bowel prep.    Prep instructions sent via [employer]         Rachel Joaquin RN  Endoscopy Procedure Pre Assessment RN  [phone] option 4    
Pre visit planning completed.      Procedure details:    Patient scheduled for Colonoscopy  on 5/23/2024.     Arrival time: 1300. Procedure time 1400    Facility location: [facility]; [address] [place] [address], [address]rd Floor, [place] [address]. Check in location: Main entrance at registration desk.    Sedation type: Conscious sedation     Pre op exam needed? N/A    Indication for procedure: Special screening for malignant neoplasms, colon [Z12.11]        Chart review:     Electronic implanted devices? No    Recent diagnosis of diverticulitis within the last 6 weeks? No    Diabetic? No      Medication review:    Anticoagulants? No    NSAIDS? Yes.  Ibuprofen (Advil, Motrin).  Holding interval of 1 day before procedure.    Other medication HOLDING recommendations:  Citrucel- HOLD x 7 days before procedure      Prep for procedure:     Bowel prep recommendation: Standard Miralax  Due to: standard bowel prep.    Prep instructions sent via [email or web address]         Rachel Joaquin RN  Endoscopy Procedure Pre Assessment RN  [phone] option 4    
Quality 110: Preventive Care And Screening: Influenza Immunization: Influenza Immunization previously received during influenza season
Quality 47: Advance Care Plan: Advance Care Planning discussed and documented in the medical record; patient did not wish or was not able to name a surrogate decision maker or provide an advance care plan.
Quality 226: Preventive Care And Screening: Tobacco Use: Screening And Cessation Intervention: Patient screened for tobacco use and is an ex/non-smoker
Quality 111:Pneumonia Vaccination Status For Older Adults: Pneumococcal Vaccination Previously Received
Quality 130: Documentation Of Current Medications In The Medical Record: Current Medications Documented
Detail Level: Zone
Quality 265: Biopsy Follow-Up: Biopsy results reviewed, communicated, tracked, and documented
Quality 431: Preventive Care And Screening: Unhealthy Alcohol Use - Screening: Patient screened for unhealthy alcohol use using a single question and scores less than 2 times per year

## 2024-05-21 DIAGNOSIS — G47.33 OBSTRUCTIVE SLEEP APNEA (ADULT) (PEDIATRIC): Primary | ICD-10-CM

## 2024-05-23 ENCOUNTER — HOSPITAL ENCOUNTER (OUTPATIENT)
Facility: CLINIC | Age: 69
Discharge: HOME | End: 2024-05-23
Attending: INTERNAL MEDICINE | Admitting: INTERNAL MEDICINE
Payer: COMMERCIAL

## 2024-05-23 VITALS
DIASTOLIC BLOOD PRESSURE: 76 MMHG | RESPIRATION RATE: 17 BRPM | SYSTOLIC BLOOD PRESSURE: 106 MMHG | HEART RATE: 62 BPM | OXYGEN SATURATION: 98 %

## 2024-05-23 LAB — COLONOSCOPY: NORMAL

## 2024-05-23 PROCEDURE — 45380 COLONOSCOPY AND BIOPSY: CPT | Performed by: INTERNAL MEDICINE

## 2024-05-23 PROCEDURE — 250N000011 HC RX IP 250 OP 636: Performed by: INTERNAL MEDICINE

## 2024-05-23 PROCEDURE — 88305 TISSUE EXAM BY PATHOLOGIST: CPT | Mod: 26 | Performed by: PATHOLOGY

## 2024-05-23 PROCEDURE — 88305 TISSUE EXAM BY PATHOLOGIST: CPT | Mod: TC | Performed by: INTERNAL MEDICINE

## 2024-05-23 PROCEDURE — 45385 COLONOSCOPY W/LESION REMOVAL: CPT | Mod: PT | Performed by: INTERNAL MEDICINE

## 2024-05-23 PROCEDURE — 99153 MOD SED SAME PHYS/QHP EA: CPT | Mod: PT | Performed by: INTERNAL MEDICINE

## 2024-05-23 PROCEDURE — G0500 MOD SEDAT ENDO SERVICE >5YRS: HCPCS | Mod: PT | Performed by: INTERNAL MEDICINE

## 2024-05-23 RX ORDER — LIDOCAINE 40 MG/G
CREAM TOPICAL
Status: DISCONTINUED | OUTPATIENT
Start: 2024-05-23 | End: 2024-05-23 | Stop reason: HOSPADM

## 2024-05-23 RX ORDER — ONDANSETRON 2 MG/ML
4 INJECTION INTRAMUSCULAR; INTRAVENOUS
Status: DISCONTINUED | OUTPATIENT
Start: 2024-05-23 | End: 2024-05-23 | Stop reason: HOSPADM

## 2024-05-23 RX ORDER — FENTANYL CITRATE 50 UG/ML
INJECTION, SOLUTION INTRAMUSCULAR; INTRAVENOUS PRN
Status: DISCONTINUED | OUTPATIENT
Start: 2024-05-23 | End: 2024-05-23 | Stop reason: HOSPADM

## 2024-05-23 ASSESSMENT — ACTIVITIES OF DAILY LIVING (ADL)
ADLS_ACUITY_SCORE: 35
ADLS_ACUITY_SCORE: 33
ADLS_ACUITY_SCORE: 35

## 2024-05-23 NOTE — H&P
Guerrero Ibrahim  [id number]  male  68 year old      Reason for procedure/surgery: hx polyps, normal colo 2019    Patient Active Problem List   Diagnosis    Solar lentiginosis    Diffuse photodamage of skin    Skin cancer screening    Verrucous keratosis    Multiple melanocytic nevi    BPH (benign prostatic hyperplasia)    Anemia    Mixed hyperlipidemia    Vitamin B deficiency    Tubular adenoma of colon    AK (actinic keratosis)    Seborrheic keratosis    Neoplasm of unspecified behavior of bone, soft tissue, and skin    Mallet finger of left hand       Past Surgical History:    Past Surgical History:   Procedure Laterality Date    APPENDECTOMY  1959    COLONOSCOPY  2003,2008,2013, 2019    ORTHOPEDIC SURGERY Right     trigger finger x2 in 2000's    SALIVARY GLAND SURGERY      removal 1998    wisdom teeth         Past Medical History: History reviewed. No pertinent past medical history.    Social History:   Social History     Tobacco Use    Smoking status: Never    Smokeless tobacco: Never    Tobacco comments:     never smoked   Substance Use Topics    Alcohol use: Yes     Comment: 1 beer or glass of wine per week       Family History:   Family History   Problem Relation Age of Onset    Hypertension Father     Cerebrovascular Disease Father     Diabetes Sister     Melanoma No family hx of        Allergies:   Allergies   Allergen Reactions    Percocet [Oxycodone-Acetaminophen] Nausea and Vomiting       Active Medications:   No current outpatient medications on file.       Systemic Review:   CONSTITUTIONAL: NEGATIVE for fever, chills, change in weight  ENT/MOUTH: NEGATIVE for ear, mouth and throat problems  RESP: NEGATIVE for significant cough or SOB  CV: NEGATIVE for chest pain, palpitations or peripheral edema    Physical Examination:   Vital Signs: /74   Resp 16   SpO2 99%   GENERAL: healthy, alert and no distress  NECK: no adenopathy, no asymmetry, masses, or scars  RESP: lungs clear to auscultation - no  rales, rhonchi or wheezes  CV: regular rate and rhythm, normal S1 S2, no S3 or S4, no murmur, click or rub, no peripheral edema and peripheral pulses strong  ABDOMEN: soft, nontender, no hepatosplenomegaly, no masses and bowel sounds normal  MS: no gross musculoskeletal defects noted, no edema    Plan: Appropriate to proceed as scheduled.      Pola Hernandez MD  5/23/2024    PCP:  Arian Paredes

## 2024-05-23 NOTE — LETTER
May 24, 2024      Guerrero Oakeser  [address] [place] [address]        Dear ,    The pathology results returned from the polyps removed at your recent colonoscopy.    One polyp was pre-cancerous but showed no active evidence of cancer.  The other 'polyp' was benign colon tissue with no added risk of colon cancer.    Current guidelines recommend that you undergo a follow-up colonoscopy in 7-10 years.      As we discussed, based on your age, I would discuss the need for this procedure with your primary care doctor closer to time.      Sincerely,               Pola Hernandez MD   [facility], [place], ENDOSCOPY  [address] [place] [address]  Phone: [phone]

## 2024-05-24 LAB
PATH REPORT.COMMENTS IMP SPEC: NORMAL
PATH REPORT.COMMENTS IMP SPEC: NORMAL
PATH REPORT.FINAL DX SPEC: NORMAL
PATH REPORT.GROSS SPEC: NORMAL
PATH REPORT.MICROSCOPIC SPEC OTHER STN: NORMAL
PATH REPORT.RELEVANT HX SPEC: NORMAL
PHOTO IMAGE: NORMAL

## 2024-06-07 ENCOUNTER — OFFICE VISIT (OUTPATIENT)
Dept: OTOLARYNGOLOGY | Facility: CLINIC | Age: 69
End: 2024-06-07
Payer: COMMERCIAL

## 2024-06-07 VITALS — BODY MASS INDEX: 25.9 KG/M2 | WEIGHT: 165 LBS | HEIGHT: 67 IN

## 2024-06-07 DIAGNOSIS — J34.2 DEVIATED NASAL SEPTUM: Primary | ICD-10-CM

## 2024-06-07 PROCEDURE — 99214 OFFICE O/P EST MOD 30 MIN: CPT | Performed by: STUDENT IN AN ORGANIZED HEALTH CARE EDUCATION/TRAINING PROGRAM

## 2024-06-07 NOTE — PROGRESS NOTES
Facial Plastic and Reconstructive Surgery Follow up      HPI:   I had the pleasure of seeing Guerrero Ibrahim today in clinic for follow up. Guerrero Ibrahim is a 68 year old male. Recall, he was on a walk on 1/13/24 and fell suffering trauma to his face. He was seen at an outside ED and imaging showed soft tissue trauma and a fracture of the anterior bony nasal septum. When I saw him, he did not have any significant breathing issues and therefore no surgery was performed.     Today, he reports he was doing very well and the left side was starting to open up however him and his wife got COVID and that really set him back.  He still recovering from that.  He reports that prior to this, he was able to breathe fairly well through the left side of his nose but this is worsened more recently since his illness.  No bleeding from the nose.  No changes in the shape of his nose.        Review Of Systems  ROS: 10 point ROS neg other than the symptoms noted above in the HPI.    Patient Active Problem List   Diagnosis    Solar lentiginosis    Diffuse photodamage of skin    Skin cancer screening    Verrucous keratosis    Multiple melanocytic nevi    BPH (benign prostatic hyperplasia)    Anemia    Mixed hyperlipidemia    Vitamin B deficiency    Tubular adenoma of colon    AK (actinic keratosis)    Seborrheic keratosis    Neoplasm of unspecified behavior of bone, soft tissue, and skin    Mallet finger of left hand     Past Surgical History:   Procedure Laterality Date    APPENDECTOMY  1959    COLONOSCOPY  2003,2008,2013, 2019    COLONOSCOPY N/A 5/23/2024    Procedure: COLONOSCOPY, WITH POLYPECTOMY AND BIOPSY;  Surgeon: Pola Cedeño MD;  Location:  GI    ORTHOPEDIC SURGERY Right     trigger finger x2 in 2000's    SALIVARY GLAND SURGERY      removal 1998    wisdom teeth       Current Outpatient Medications   Medication Sig Dispense Refill    finasteride (PROSCAR) 5 MG tablet Take 1 tablet (5 mg) by mouth daily 90 tablet 3     methylcellulose (CITRUCEL) powder       tamsulosin (FLOMAX) 0.4 MG capsule TAKE 1 CAPSULE (0.4 MG) BY MOUTH DAILY 90 capsule 3    vitamin D3 (CHOLECALCIFEROL) 2000 units (50 mcg) tablet Take 1 tablet by mouth daily       Percocet [oxycodone-acetaminophen]  Social History     Socioeconomic History    Marital status:      Spouse name: Not on file    Number of children: Not on file    Years of education: Not on file    Highest education level: Not on file   Occupational History    Not on file   Tobacco Use    Smoking status: Never    Smokeless tobacco: Never    Tobacco comments:     never smoked   Substance and Sexual Activity    Alcohol use: Yes     Comment: 1 beer or glass of wine per week    Drug use: Never    Sexual activity: Yes     Partners: Female     Birth control/protection: None   Other Topics Concern    Parent/sibling w/ CABG, MI or angioplasty before 65F 55M? Yes   Social History Narrative    Not on file     Social Determinants of Health     Financial Resource Strain: Low Risk  (1/16/2024)    Financial Resource Strain     Within the past 12 months, have you or your family members you live with been unable to get utilities (heat, electricity) when it was really needed?: No   Food Insecurity: Low Risk  (1/16/2024)    Food Insecurity     Within the past 12 months, did you worry that your food would run out before you got money to buy more?: No     Within the past 12 months, did the food you bought just not last and you didn t have money to get more?: No   Transportation Needs: Low Risk  (1/16/2024)    Transportation Needs     Within the past 12 months, has lack of transportation kept you from medical appointments, getting your medicines, non-medical meetings or appointments, work, or from getting things that you need?: No   Physical Activity: Not on file   Stress: Not on file   Social Connections: Not on file   Interpersonal Safety: Not on file   Housing Stability: Low Risk  (1/16/2024)    Housing  Stability     Do you have housing? : Yes     Are you worried about losing your housing?: No     Family History   Problem Relation Age of Onset    Hypertension Father     Cerebrovascular Disease Father     Diabetes Sister     Melanoma No family hx of        PE:  Alert and Oriented, Answering Questions Appropriately  Atraumatic, Normocephalic, Face Symmetric  Skin: Ward 2  Facial Nerve Intact and facial movement symmetric  EOMI  Nasal Exam: On frontal view, his nasal bones are relatively straight.  Anterior rhinoscopy reveals a severely deviated caudal septum to the left that is completely off the maxillary crest and almost touching the lateral nasal wall.  This causes a near 100% obstruction on this side.  I am not able to visualize the turbinate on this side.  Chin: Normal   Lips/Teeth/Toungue/Gums: Lips intact  Neck: Trachea midline  Chest: No wheezing, cyanosis, or stridor  Card: not diaphoretic  Neuro/Psych: CN's 2-12 intact, Moves all extremities, ambulation in intact, positive affect, no notable muscle weakness            Imaging:CT head from 1/13/24 is personally reviewed by me. This shows soft tissue trauma to the nasal dorsum. No obvious nasal bone fracture. Fracture of anterior bony nasal septum.       IMPRESSION/PLAN: Guerrero Ibrahim is a 68 year old male presenting for follow up of facial trauma that resulted in a nasal septal fracture in January 2024. Today, he reports he was breathing okay through the left side of his nose but then got COVID and set him back. He reports that he does not think he is interested in doing any surgery at this point since he seemed to be doing well prior.  I did talk about an open septorhinoplasty approach to reconstruct the septum given the significant caudal deviation.  He definitely has a reason not to breathe well through that left side.  I am happy to talk to him in the future once he fully recovers if surgery is something he would like to pursue.  Otherwise for  now, he can use nasal saline spray.  Photodocumentation was obtained.

## 2024-08-20 DIAGNOSIS — N40.0 BENIGN PROSTATIC HYPERPLASIA WITHOUT LOWER URINARY TRACT SYMPTOMS: ICD-10-CM

## 2024-08-22 RX ORDER — TAMSULOSIN HYDROCHLORIDE 0.4 MG/1
0.4 CAPSULE ORAL DAILY
Qty: 90 CAPSULE | Refills: 2 | Status: SHIPPED | OUTPATIENT
Start: 2024-08-22

## 2024-08-22 NOTE — TELEPHONE ENCOUNTER
Medication Requested:   Disp Refills Start End YUSEF   tamsulosin (FLOMAX) 0.4 MG capsule 90 capsule 3 8/10/2023 -- No   Sig - Route: TAKE 1 CAPSULE (0.4 MG) BY MOUTH DAILY - Oral     ----------------------  Last Office Visit : 4/1/2024  [facility] [place]      Future Office visit:  none  ----------------------      Refill decision: Medication refilled per protocol.    Pass/Fail Protocol Criteria:    Alpha Blockers [id number]/2024 03:26 PM   Protocol Details Blood pressure under 140/90 in past 12 months    Patient does not have Tadalafil, Vardenafil, or Sildenafil on their medication list    Medication is active on med list    Recent (12 mo) or future (90 days) visit within the authorizing provider's specialty    Medication indicated for associated diagnosis    Patient is 18 years of age or older        BP Readings from Last 3 Encounters:   05/23/24 106/76   03/16/24 (!) 161/91   01/22/24 (!) 169/89

## 2024-08-24 DIAGNOSIS — N40.0 BENIGN PROSTATIC HYPERPLASIA WITHOUT LOWER URINARY TRACT SYMPTOMS: ICD-10-CM

## 2024-08-28 RX ORDER — FINASTERIDE 5 MG/1
1 TABLET, FILM COATED ORAL DAILY
Qty: 90 TABLET | Refills: 0 | Status: SHIPPED | OUTPATIENT
Start: 2024-08-28

## 2024-08-28 NOTE — TELEPHONE ENCOUNTER
finasteride (PROSCAR) 5 MG tablet       Last Written Prescription Date:  8/24/23  Last Fill Quantity: 90,   # refills: 3  Last Office Visit : 4/1/24  Future Office visit:  None    Refilled per protocol  Yumi MAHAJAN RN  P Central Nursing/Red Flag Triage & Med Refill Team

## 2024-09-22 ENCOUNTER — HEALTH MAINTENANCE LETTER (OUTPATIENT)
Age: 69
End: 2024-09-22

## 2024-09-23 ENCOUNTER — TELEPHONE (OUTPATIENT)
Dept: INTERNAL MEDICINE | Facility: CLINIC | Age: 69
End: 2024-09-23
Payer: COMMERCIAL

## 2024-09-23 NOTE — TELEPHONE ENCOUNTER
[facility] Call Center    Phone Message    May a detailed message be left on voicemail: yes     Reason for Call: Other: patient called stated he needed to schedule physical. Dr Paredes has no schedule showing. Patient refused any other provider. Advise.     Action Taken: Message routed to:  [facility] pcc    Travel Screening: Not Applicable     Date of Service:

## 2024-09-23 NOTE — TELEPHONE ENCOUNTER
Left Voicemail (1st Attempt) and Sent Mychart (1st Attempt) for the patient to call back and schedule the following:    Appointment type: [facility] RETURN  Provider: PCP  Return date: Soonest available  Specialty phone number: [phone]

## 2024-09-25 ENCOUNTER — TELEPHONE (OUTPATIENT)
Dept: DERMATOLOGY | Facility: CLINIC | Age: 69
End: 2024-09-25
Payer: COMMERCIAL

## 2024-09-25 NOTE — TELEPHONE ENCOUNTER
Left Voicemail (2nd Attempt) for the patient to call back and schedule the following:    Appointment type: [facility] RETURN / [facility] PHYSICAL  Provider: PCP  Return date: Soonest available  Specialty phone number: [phone]

## 2024-09-25 NOTE — TELEPHONE ENCOUNTER
"Dr Ashok Vargas is unfornately unavailable on 12/24/2024. Your appointment will need to be rescheduled. We apologize for the inconvenience.    Please call [phone] to schedule the visit(s).      (If you prefer, some clinics allow you to schedule at [email or web address]. Click the \"Visits\" tab, then choose \"Schedule an Appointment.\")        Sincerely, [facility]    "

## 2024-10-03 NOTE — PROGRESS NOTES
Virtual Visit Details    Type of service:  Video Visit   Video Start Time: 10:05 AM  Video End Time:10:20 AM    Originating Location (pt. Location): Home    Distant Location (provider location):  Off-site  Platform used for Video Visit: [facility]    CPAP Follow-Up Visit:    Date on this visit: 10/4/2024    Guerrero Ibrahim has a establish care for LISA initially diagnosed in the early 1990s. His medical history is significant for anemia, hyperlipidemia, BPH, vit D deficiency.     He was initially diagnosed with LISA in [place] in 1992.       Guerrero had a second PSG at [facility] in [facility] on 2/13/2013. His AHI was 21.3/hr, O2 emiliano 90%. His sleep efficiency was 35.4%. His weight was 179#.    Guerrero has no questions or concerns today.     ResMed: fixed pressure 15 cm. His machine was repaired last year. His machine is 6-7 years old, he got it in [place].    The compliance data shows that the patient used the CPAP for 30/30 nights, 100% of nights for >4 hours.  The 95th% pressure is 15 cm.  The 95th% leak is 32.4 lpm.  The average nightly usage is 6:10.  The average AHI is 0.3/hr.       No specialty comments available.    Do you use a CPAP Machine at home: Yes  Overall, on a scale of 0-10 how would you rate your CPAP (0 poor, 10 great): 10    What type of mask do you use: Nasal Mask Eson 2 he feels it is a great mask  Is your mask comfortable: Yes  If not, why:    How often do you replace supplies: He replaces the filters regularly, but other parts not as often as covered. He does not feel it is necessary.     Is your mask leaking: No  If yes, where do you feel it:    How many night per week does the mask leak (0-7):      Do you notice snoring with mask on: No  Do you notice gasping arousals with mask on: No  Are you having significant oral or nasal dryness: No  Are you using the humidifier: yes  Does the water chamber run out before the night is over:only if he sleeps more than his typical 6 hours.  Do you get  condensation in the mask or hose: infrequent in the mask, does not wake him  Is the pressure setting comfortable: Yes  If not, why:      Typical bedtime: 12:30 a.m.  Sleep latency on PAP therapy: 10 minutes  Typical wake time: 6:30 - 7:00 a.m.  Wakes 0 times per night, usually, sometimes once for the restroom. He may be up for 2 minutes.   How many hours on average per night are you using PAP therapy: 6  How many hours are you sleeping per night: 6  Do you feel well rested in the morning: Yes    Naps: 0.       Weight change since sleep study: 165 lbs      Past medical/surgical history, family history, social history, medications and allergies were reviewed.      Problem List:  Patient Active Problem List    Diagnosis Date Noted    Mallet finger of left hand 03/05/2024     Priority: Medium    Neoplasm of unspecified behavior of bone, soft tissue, and skin 10/21/2023     Priority: Medium    AK (actinic keratosis) 11/05/2022     Priority: Medium    Seborrheic keratosis 11/05/2022     Priority: Medium    Anemia 04/04/2022     Priority: Medium    Mixed hyperlipidemia 04/04/2022     Priority: Medium    Vitamin B deficiency 04/04/2022     Priority: Medium    Tubular adenoma of colon 04/04/2022     Priority: Medium    Verrucous keratosis 06/06/2021     Priority: Medium    Multiple melanocytic nevi 06/06/2021     Priority: Medium    Solar lentiginosis 09/14/2019     Priority: Medium    Diffuse photodamage of skin 09/14/2019     Priority: Medium    Skin cancer screening 09/14/2019     Priority: Medium    BPH (benign prostatic hyperplasia) 01/01/2002     Priority: Medium        Impression/Plan:    (G47.33) Obstructive sleep apnea (adult) (pediatric)  (primary encounter diagnosis)  Comment: Guerrero is using CPAP regularly for almost 30 years and he benefits greatly from use. He has no concerns today. His download shows a slightly high leak. He has lost almost 15# since his last sleep study. He does not replace supplies as often as  covered.   Plan: Comprehensive DME        Continue CPAP 15 cm. A prescription was written for new supplies. We reviewed recommendations for cleaning and replacing supplies. I encouraged him to track his leak after getting a new mask. If still high, we could try setting his pressures to auto mode 13-15 cm to see if he needs less pressure since his weight loss. He currently tolerates the pressure very well and does not notice leak, so is not eager to make a change.       He will follow up with me in about 1-2 year(s), depending on his insurance requirement.     21 minutes were spent on the date of the encounter doing chart review, history and exam, documentation and further activities as noted above.     Bennett Goltz, PA-C    CC: No ref. provider found

## 2024-10-04 ENCOUNTER — VIRTUAL VISIT (OUTPATIENT)
Dept: SLEEP MEDICINE | Facility: CLINIC | Age: 69
End: 2024-10-04
Payer: COMMERCIAL

## 2024-10-04 VITALS — WEIGHT: 165 LBS | BODY MASS INDEX: 25.01 KG/M2 | HEIGHT: 68 IN

## 2024-10-04 DIAGNOSIS — G47.33 OBSTRUCTIVE SLEEP APNEA (ADULT) (PEDIATRIC): Primary | ICD-10-CM

## 2024-10-04 PROCEDURE — 99213 OFFICE O/P EST LOW 20 MIN: CPT | Mod: 95 | Performed by: PHYSICIAN ASSISTANT

## 2024-10-04 ASSESSMENT — SLEEP AND FATIGUE QUESTIONNAIRES
HOW LIKELY ARE YOU TO NOD OFF OR FALL ASLEEP WHILE LYING DOWN TO REST IN THE AFTERNOON WHEN CIRCUMSTANCES PERMIT: SLIGHT CHANCE OF DOZING
HOW LIKELY ARE YOU TO NOD OFF OR FALL ASLEEP WHILE SITTING QUIETLY AFTER LUNCH WITHOUT ALCOHOL: WOULD NEVER DOZE
HOW LIKELY ARE YOU TO NOD OFF OR FALL ASLEEP WHILE SITTING AND READING: SLIGHT CHANCE OF DOZING
HOW LIKELY ARE YOU TO NOD OFF OR FALL ASLEEP WHILE SITTING AND TALKING TO SOMEONE: WOULD NEVER DOZE
HOW LIKELY ARE YOU TO NOD OFF OR FALL ASLEEP WHILE WATCHING TV: SLIGHT CHANCE OF DOZING
HOW LIKELY ARE YOU TO NOD OFF OR FALL ASLEEP WHILE SITTING INACTIVE IN A PUBLIC PLACE: WOULD NEVER DOZE
HOW LIKELY ARE YOU TO NOD OFF OR FALL ASLEEP WHEN YOU ARE A PASSENGER IN A CAR FOR AN HOUR WITHOUT A BREAK: WOULD NEVER DOZE
HOW LIKELY ARE YOU TO NOD OFF OR FALL ASLEEP IN A CAR, WHILE STOPPED FOR A FEW MINUTES IN TRAFFIC: WOULD NEVER DOZE

## 2024-10-04 ASSESSMENT — PAIN SCALES - GENERAL: PAINLEVEL: NO PAIN (0)

## 2024-10-04 NOTE — NURSING NOTE
Current patient location: [employer] [facility] [employer] [address] [place]  Is the patient currently in the state of [place]? YES    Visit mode:VIDEO    If the visit is dropped, the patient can be reconnected by: VIDEO VISIT: Send to e-mail at: [email or web address]    Will anyone else be joining the visit? NO  (If patient encounters technical issues they should call [phone]    Are changes needed to the allergy or medication list? Pt stated no changes to allergies and Pt stated no med changes    Are refills needed on medications prescribed by this physician? NO    Rooming Documentation:  Questionnaire(s) completed    Reason for visit: Kya GASPARF

## 2024-11-08 SDOH — HEALTH STABILITY: PHYSICAL HEALTH: ON AVERAGE, HOW MANY DAYS PER WEEK DO YOU ENGAGE IN MODERATE TO STRENUOUS EXERCISE (LIKE A BRISK WALK)?: 7 DAYS

## 2024-11-08 SDOH — HEALTH STABILITY: PHYSICAL HEALTH: ON AVERAGE, HOW MANY MINUTES DO YOU ENGAGE IN EXERCISE AT THIS LEVEL?: 50 MIN

## 2024-11-08 ASSESSMENT — SOCIAL DETERMINANTS OF HEALTH (SDOH): HOW OFTEN DO YOU GET TOGETHER WITH FRIENDS OR RELATIVES?: ONCE A WEEK

## 2024-11-09 ENCOUNTER — LAB (OUTPATIENT)
Dept: LAB | Facility: CLINIC | Age: 69
End: 2024-11-09
Payer: COMMERCIAL

## 2024-11-09 ENCOUNTER — ANCILLARY PROCEDURE (OUTPATIENT)
Dept: GENERAL RADIOLOGY | Facility: CLINIC | Age: 69
End: 2024-11-09
Attending: INTERNAL MEDICINE
Payer: COMMERCIAL

## 2024-11-09 ENCOUNTER — OFFICE VISIT (OUTPATIENT)
Dept: INTERNAL MEDICINE | Facility: CLINIC | Age: 69
End: 2024-11-09
Payer: COMMERCIAL

## 2024-11-09 VITALS
OXYGEN SATURATION: 99 % | DIASTOLIC BLOOD PRESSURE: 84 MMHG | HEART RATE: 70 BPM | HEIGHT: 68 IN | BODY MASS INDEX: 26.01 KG/M2 | WEIGHT: 171.6 LBS | SYSTOLIC BLOOD PRESSURE: 160 MMHG

## 2024-11-09 DIAGNOSIS — R05.9 COUGH, UNSPECIFIED TYPE: ICD-10-CM

## 2024-11-09 DIAGNOSIS — D64.9 ANEMIA, UNSPECIFIED TYPE: ICD-10-CM

## 2024-11-09 DIAGNOSIS — Z12.5 SCREENING FOR PROSTATE CANCER: ICD-10-CM

## 2024-11-09 DIAGNOSIS — Z13.220 SCREENING CHOLESTEROL LEVEL: Primary | ICD-10-CM

## 2024-11-09 DIAGNOSIS — Z13.220 SCREENING CHOLESTEROL LEVEL: ICD-10-CM

## 2024-11-09 LAB
BASOPHILS # BLD AUTO: 0.1 10E3/UL (ref 0–0.2)
BASOPHILS NFR BLD AUTO: 1 %
CHOLEST SERPL-MCNC: 205 MG/DL
EOSINOPHIL # BLD AUTO: 0.3 10E3/UL (ref 0–0.7)
EOSINOPHIL NFR BLD AUTO: 4 %
ERYTHROCYTE [DISTWIDTH] IN BLOOD BY AUTOMATED COUNT: 12 % (ref 10–15)
FASTING STATUS PATIENT QL REPORTED: ABNORMAL
FOLATE SERPL-MCNC: 11.3 NG/ML (ref 4.6–34.8)
HCT VFR BLD AUTO: 40.4 % (ref 40–53)
HDLC SERPL-MCNC: 47 MG/DL
HGB BLD-MCNC: 13.6 G/DL (ref 13.3–17.7)
IMM GRANULOCYTES # BLD: 0 10E3/UL
IMM GRANULOCYTES NFR BLD: 0 %
IRON BINDING CAPACITY (ROCHE): 310 UG/DL (ref 240–430)
IRON SATN MFR SERPL: 23 % (ref 15–46)
IRON SERPL-MCNC: 72 UG/DL (ref 61–157)
LDLC SERPL CALC-MCNC: 134 MG/DL
LYMPHOCYTES # BLD AUTO: 3.1 10E3/UL (ref 0.8–5.3)
LYMPHOCYTES NFR BLD AUTO: 44 %
MCH RBC QN AUTO: 30.3 PG (ref 26.5–33)
MCHC RBC AUTO-ENTMCNC: 33.7 G/DL (ref 31.5–36.5)
MCV RBC AUTO: 90 FL (ref 78–100)
MONOCYTES # BLD AUTO: 0.6 10E3/UL (ref 0–1.3)
MONOCYTES NFR BLD AUTO: 8 %
NEUTROPHILS # BLD AUTO: 3.1 10E3/UL (ref 1.6–8.3)
NEUTROPHILS NFR BLD AUTO: 44 %
NONHDLC SERPL-MCNC: 158 MG/DL
NRBC # BLD AUTO: 0 10E3/UL
NRBC BLD AUTO-RTO: 0 /100
PLATELET # BLD AUTO: 215 10E3/UL (ref 150–450)
RBC # BLD AUTO: 4.49 10E6/UL (ref 4.4–5.9)
TRIGL SERPL-MCNC: 121 MG/DL
VIT B12 SERPL-MCNC: 596 PG/ML (ref 232–1245)
WBC # BLD AUTO: 7.1 10E3/UL (ref 4–11)

## 2024-11-09 PROCEDURE — 82746 ASSAY OF FOLIC ACID SERUM: CPT | Performed by: INTERNAL MEDICINE

## 2024-11-09 PROCEDURE — 83540 ASSAY OF IRON: CPT | Performed by: PATHOLOGY

## 2024-11-09 PROCEDURE — G0103 PSA SCREENING: HCPCS | Performed by: PATHOLOGY

## 2024-11-09 PROCEDURE — 36415 COLL VENOUS BLD VENIPUNCTURE: CPT | Performed by: PATHOLOGY

## 2024-11-09 PROCEDURE — 82607 VITAMIN B-12: CPT | Performed by: INTERNAL MEDICINE

## 2024-11-09 PROCEDURE — 82728 ASSAY OF FERRITIN: CPT | Performed by: PATHOLOGY

## 2024-11-09 PROCEDURE — 80061 LIPID PANEL: CPT | Performed by: PATHOLOGY

## 2024-11-09 PROCEDURE — 83550 IRON BINDING TEST: CPT | Performed by: PATHOLOGY

## 2024-11-09 PROCEDURE — 93000 ELECTROCARDIOGRAM COMPLETE: CPT | Performed by: INTERNAL MEDICINE

## 2024-11-09 PROCEDURE — 99000 SPECIMEN HANDLING OFFICE-LAB: CPT | Performed by: PATHOLOGY

## 2024-11-09 PROCEDURE — 99397 PER PM REEVAL EST PAT 65+ YR: CPT | Mod: 25 | Performed by: INTERNAL MEDICINE

## 2024-11-09 PROCEDURE — 71046 X-RAY EXAM CHEST 2 VIEWS: CPT | Performed by: STUDENT IN AN ORGANIZED HEALTH CARE EDUCATION/TRAINING PROGRAM

## 2024-11-09 PROCEDURE — 85025 COMPLETE CBC W/AUTO DIFF WBC: CPT | Performed by: PATHOLOGY

## 2024-11-09 NOTE — PATIENT INSTRUCTIONS
PFT Pulmonary Function Testing [phone] (3rd Floor [facility])

## 2024-11-09 NOTE — PROGRESS NOTES
HPI;    Guerrero comes in for a physical today. He had COVID 4/2024 and has had some SOB and chest sxs since then. He is near his baseline but not quite back yet. He has a nasal fall injury and still has trouble breathing out of his L nostril. He continues to use his CPAP machine and finds this beneficial. No other HEENT, cardiopulmonary, abdominal, , neurological, systemic, psychiatric, lymphatic, endocrine, vascular complaints. He denies smoking or EtOH abuse.     No past medical history on file.  Past Surgical History:   Procedure Laterality Date    APPENDECTOMY  1959    COLONOSCOPY  2003,2008,2013, 2019    COLONOSCOPY N/A 5/23/2024    Procedure: COLONOSCOPY, WITH POLYPECTOMY AND BIOPSY;  Surgeon: Pola Cedeño MD;  Location:  GI    ORTHOPEDIC SURGERY Right     trigger finger x2 in 2000's    SALIVARY GLAND SURGERY      removal 1998    wisdom teeth       PE:    Vitals noted, gen, nad, cooperative, alert, neck supple nl rom, lungs with good air movement, clear no wheezing, RRR, S1, S2, no MRG, abdomen, no acute findings. Grossly normal neurological exam.     Results for orders placed or performed in visit on 11/09/24   Iron and iron binding capacity     Status: Normal   Result Value Ref Range    Iron 72 61 - 157 ug/dL    Iron Binding Capacity 310 240 - 430 ug/dL    Iron Sat Index 23 15 - 46 %   Lipid panel reflex to direct LDL Fasting     Status: Abnormal   Result Value Ref Range    Cholesterol 205 (H) <200 mg/dL    Triglycerides 121 <150 mg/dL    Direct Measure HDL 47 >=40 mg/dL    LDL Cholesterol Calculated 134 (H) <100 mg/dL    Non HDL Cholesterol 158 (H) <130 mg/dL    Patient Fasting > 8hrs? Unknown     Narrative    Cholesterol  Desirable: < 200 mg/dL  Borderline High: 200 - 239 mg/dL  High: >= 240 mg/dL    Triglycerides  Normal: < 150 mg/dL  Borderline High: 150 - 199 mg/dL  High: 200-499 mg/dL  Very High: >= 500 mg/dL    Direct Measure HDL  Female: >= 50 mg/dL   Male: >= 40 mg/dL    LDL  Cholesterol  Desirable: < 100 mg/dL  Above Desirable: 100 - 129 mg/dL   Borderline High: 130 - 159 mg/dL   High:  160 - 189 mg/dL   Very High: >= 190 mg/dL    Non HDL Cholesterol  Desirable: < 130 mg/dL  Above Desirable: 130 - 159 mg/dL  Borderline High: 160 - 189 mg/dL  High: 190 - 219 mg/dL  Very High: >= 220 mg/dL   CBC with platelets and differential     Status: None   Result Value Ref Range    WBC Count 7.1 4.0 - 11.0 10e3/uL    RBC Count 4.49 4.40 - 5.90 10e6/uL    Hemoglobin 13.6 13.3 - 17.7 g/dL    Hematocrit 40.4 40.0 - 53.0 %    MCV 90 78 - 100 fL    MCH 30.3 26.5 - 33.0 pg    MCHC 33.7 31.5 - 36.5 g/dL    RDW 12.0 10.0 - 15.0 %    Platelet Count 215 150 - 450 10e3/uL    % Neutrophils 44 %    % Lymphocytes 44 %    % Monocytes 8 %    % Eosinophils 4 %    % Basophils 1 %    % Immature Granulocytes 0 %    NRBCs per 100 WBC 0 <1 /100    Absolute Neutrophils 3.1 1.6 - 8.3 10e3/uL    Absolute Lymphocytes 3.1 0.8 - 5.3 10e3/uL    Absolute Monocytes 0.6 0.0 - 1.3 10e3/uL    Absolute Eosinophils 0.3 0.0 - 0.7 10e3/uL    Absolute Basophils 0.1 0.0 - 0.2 10e3/uL    Absolute Immature Granulocytes 0.0 <=0.4 10e3/uL    Absolute NRBCs 0.0 10e3/uL   CBC with platelets and differential     Status: None    Narrative    The following orders were created for panel order CBC with platelets and differential.  Procedure                               Abnormality         Status                     ---------                               -----------         ------                     CBC with platelets and d...[[id number]]                      Final result                 Please view results for these tests on the individual orders.   Results for orders placed or performed in visit on 11/09/24   EKG 12-lead complete w/read - Clinics     Status: None (Preliminary result)   Result Value Ref Range    Systolic Blood Pressure  mmHg    Diastolic Blood Pressure  mmHg    Ventricular Rate 69 BPM    Atrial Rate 69 BPM    IN Interval 198 ms     QRS Duration 88 ms     ms    QTc 439 ms    P Axis 60 degrees    R AXIS -16 degrees    T Axis 53 degrees    Interpretation ECG       Sinus rhythm  Normal ECG  No previous ECGs available           A/P:    1. Immunizations;  Shingrix vaccination x 2. Moderna COVID vaccine x 7. Pfizer COVID x 2   Tdap 3/15/2018. Pneumococcal 23 done 8/2/2018. Prevnar 13 done 10/15/2015. Influenza vaccine done 9/19/2024   2. BPH on Finasteride and Flomax; PSA 1.65 on 7/31/2023.   3. Colonoscopy; 5/23/2024  4. Lipids; 2/28/2022; , TG's 74 and HDL 53. Repeat on 7/31/2023; TG's 176, HDl 43 and .   5. Dermatology; seen by Dr. Pulido next on 3/13/2025   6. 24 hour BP monitor normal at 129/75 on 3/3/2022. Elevated BP today and will follow.   7. LISA on CPAP.  Sleep Medicine note from  10/4/2024.   8. Hgb slightly low at 13.1 on 7/5/2023. MCV 88 and normal RDW. Repeat labs 7/31/2023 with Hgb 13.0, MCV 90, RDW 11.9%. Iron studies, ferritin, B12 and Folate normal.   9. ENT with Dr. Posadas 6/7/2024 for facial injury  10. SOB after COVID in 4/2024; EKG, labs, CXR, PFT's and Chest CT scan

## 2024-11-10 LAB
ATRIAL RATE - MUSE: 69 BPM
DIASTOLIC BLOOD PRESSURE - MUSE: NORMAL MMHG
FERRITIN SERPL-MCNC: 76 NG/ML (ref 31–409)
INTERPRETATION ECG - MUSE: NORMAL
P AXIS - MUSE: 60 DEGREES
PR INTERVAL - MUSE: 198 MS
PSA SERPL DL<=0.01 NG/ML-MCNC: 1.49 NG/ML (ref 0–6.5)
QRS DURATION - MUSE: 88 MS
QT - MUSE: 410 MS
QTC - MUSE: 439 MS
R AXIS - MUSE: -16 DEGREES
SYSTOLIC BLOOD PRESSURE - MUSE: NORMAL MMHG
T AXIS - MUSE: 53 DEGREES
VENTRICULAR RATE- MUSE: 69 BPM

## 2024-11-12 ENCOUNTER — OFFICE VISIT (OUTPATIENT)
Dept: PULMONOLOGY | Facility: CLINIC | Age: 69
End: 2024-11-12
Payer: COMMERCIAL

## 2024-11-12 DIAGNOSIS — D64.9 ANEMIA, UNSPECIFIED TYPE: ICD-10-CM

## 2024-11-12 DIAGNOSIS — R05.9 COUGH, UNSPECIFIED TYPE: ICD-10-CM

## 2024-11-12 DIAGNOSIS — Z13.220 SCREENING CHOLESTEROL LEVEL: ICD-10-CM

## 2024-11-12 DIAGNOSIS — Z12.5 SCREENING FOR PROSTATE CANCER: ICD-10-CM

## 2024-11-12 LAB
DLCOCOR-%PRED-PRE: 107 %
DLCOCOR-PRE: 26.11 ML/MIN/MMHG
DLCOUNC-%PRED-PRE: 104 %
DLCOUNC-PRE: 25.34 ML/MIN/MMHG
DLCOUNC-PRED: 24.21 ML/MIN/MMHG
ERV-%PRED-PRE: 73 %
ERV-PRE: 0.98 L
ERV-PRED: 1.33 L
EXPTIME-PRE: 7.7 SEC
FEF2575-%PRED-PRE: 176 %
FEF2575-PRE: 3.8 L/SEC
FEF2575-PRED: 2.16 L/SEC
FEFMAX-%PRED-PRE: 109 %
FEFMAX-PRE: 8.5 L/SEC
FEFMAX-PRED: 7.77 L/SEC
FEV1-%PRED-PRE: 112 %
FEV1-PRE: 3.11 L
FEV1FEV6-PRE: 83 %
FEV1FEV6-PRED: 77 %
FEV1FVC-PRE: 83 %
FEV1FVC-PRED: 77 %
FEV1SVC-PRE: 78 %
FEV1SVC-PRED: 70 %
FIFMAX-PRE: 6.48 L/SEC
FRCPLETH-%PRED-PRE: 90 %
FRCPLETH-PRE: 3.25 L
FRCPLETH-PRED: 3.59 L
FVC-%PRED-PRE: 104 %
FVC-PRE: 3.75 L
FVC-PRED: 3.6 L
IC-%PRED-PRE: 111 %
IC-PRE: 2.99 L
IC-PRED: 2.67 L
RVPLETH-%PRED-PRE: 87 %
RVPLETH-PRE: 2.27 L
RVPLETH-PRED: 2.6 L
TLCPLETH-%PRED-PRE: 92 %
TLCPLETH-PRE: 6.24 L
TLCPLETH-PRED: 6.72 L
VA-%PRED-PRE: 97 %
VA-PRE: 5.8 L
VC-%PRED-PRE: 100 %
VC-PRE: 3.97 L
VC-PRED: 3.95 L

## 2024-11-12 PROCEDURE — 94375 RESPIRATORY FLOW VOLUME LOOP: CPT | Performed by: INTERNAL MEDICINE

## 2024-11-12 PROCEDURE — 94726 PLETHYSMOGRAPHY LUNG VOLUMES: CPT | Performed by: INTERNAL MEDICINE

## 2024-11-12 PROCEDURE — 94150 VITAL CAPACITY TEST: CPT | Performed by: INTERNAL MEDICINE

## 2024-11-12 PROCEDURE — 94729 DIFFUSING CAPACITY: CPT | Performed by: INTERNAL MEDICINE

## 2024-11-18 DIAGNOSIS — N40.0 BENIGN PROSTATIC HYPERPLASIA WITHOUT LOWER URINARY TRACT SYMPTOMS: ICD-10-CM

## 2024-11-20 ENCOUNTER — MYC MEDICAL ADVICE (OUTPATIENT)
Dept: INTERNAL MEDICINE | Facility: CLINIC | Age: 69
End: 2024-11-20
Payer: COMMERCIAL

## 2024-11-20 NOTE — TELEPHONE ENCOUNTER
Left Voicemail (1st Attempt) and Sent Mychart (1st Attempt) for the patient to call back and schedule the following:    Appointment type: Chest CT  Provider: Ordered by PCP  Return date: Next available   Specialty phone number: [phone]  Additonal Notes: per check out - (4) future Chest CT

## 2024-11-21 RX ORDER — FINASTERIDE 5 MG/1
1 TABLET, FILM COATED ORAL DAILY
Qty: 90 TABLET | Refills: 3 | Status: SHIPPED | OUTPATIENT
Start: 2024-11-21

## 2024-11-29 ENCOUNTER — ANCILLARY PROCEDURE (OUTPATIENT)
Dept: CT IMAGING | Facility: CLINIC | Age: 69
End: 2024-11-29
Attending: INTERNAL MEDICINE
Payer: COMMERCIAL

## 2024-11-29 DIAGNOSIS — D64.9 ANEMIA, UNSPECIFIED TYPE: ICD-10-CM

## 2024-11-29 DIAGNOSIS — Z13.220 SCREENING CHOLESTEROL LEVEL: ICD-10-CM

## 2024-11-29 DIAGNOSIS — Z12.5 SCREENING FOR PROSTATE CANCER: ICD-10-CM

## 2024-11-29 DIAGNOSIS — R05.9 COUGH, UNSPECIFIED TYPE: ICD-10-CM

## 2024-11-29 PROCEDURE — 71250 CT THORAX DX C-: CPT | Performed by: RADIOLOGY

## 2024-12-02 ENCOUNTER — PATIENT OUTREACH (OUTPATIENT)
Dept: ONCOLOGY | Facility: CLINIC | Age: 69
End: 2024-12-02
Payer: COMMERCIAL

## 2024-12-02 NOTE — PROGRESS NOTES
New IP (Interventional Pulmonology) referral rec'd.  Chart reviewed.       New Patient: Interventional Pulmonary (Lung nodule) Nurse Navigator Note    Referring provider:   Arian Paredes MD [facility] [phone]       Referred to (specialty): Interventional Pulmonary (Lung nodule)    Requested provider (if applicable): n/a    Date Referral Received: 12/2/2024    Evaluation for :  Lung nodule    Clinical History (per Nurse review of records provided):    **BOOK MARKED**    11/12/24:  General PFT Lab        CT chest without contrast     INDICATION: Screening cholesterol level. Anemia.     COMPARISON: No prior CT chest. Plain films 11/9/2024. FINDINGS: No  contrast. The included thyroid appears unremarkable. Mild  atherosclerotic calcification in multiple coronary arteries and in the  thoracic aorta trace calcification in the upper abdominal aorta.  Calcification in the right kidney measuring approximately 5 mm which  may represent a nonobstructing stone. Adrenals normal. No  hydronephrosis possible anterior right renal cyst at the upper pole.  Mild fatty pancreatic atrophy.  No pleural or pericardial effusion. No enlarged lymph nodes in the  chest. Heart size normal. Pulmonary artery caliber normal. Thoracic  aortic caliber normal.  Bone detail shows multilevel degenerative osteophyte formation in the  thoracic spine and lower cervical spine and upper lumbar spine. No  suspicious sclerotic or lytic/destructive lesion.     Detail of the lungs shows punctate calcified right lateral apical  granuloma. Endobronchial 3 mm nodule in the right upper lobe (4/111).  Another endobronchial right middle lobe nodule (4/156) also measures 3  mm. An additional endobronchial right middle lobe nodule in the right  middle lobe (4/169) 3 punctate calcified right lower lobe granuloma  laterally. Distal endobronchial right middle lobe anterior nodule  measures 4  mm (4/195). 2 mm right middle lobe endobronchial nodule  (4/189). 3 mm right lower lobe parenchymal nodule (4/206). Calcified  medial right lower lobe granulomas. Punctate calcified granuloma left  lower lobe adjacent to the major fissure. No other left lung  opacities. Major airways centrally appear nicely patent.                                                                      IMPRESSION: Incidental endobronchial nodules in the right upper and  middle lobes a 3 mm parenchymal right lower lobe nodule an other  granulomas. Recommend 3 to 6= month follow-up. Additional benign  granulomas. Atherosclerosis. Right renal cyst. Diffuse degenerative  changes in the spine.     LUDA MONGE MD     Records Location: [facility]     Records Needed: none    Additional testing needed prior to consult: NONE

## 2024-12-08 NOTE — PROGRESS NOTES
Phone call duration: 11 minutes  Signed Electronically by: Arian Paredes MD  Telephone visit     Mr. Ibrahim agrees to a telephone visit    Last visit with us was 11/9/2024 and additional information in that note.     He is scheduled to see Dr. Leonardo, Pulmonary 12/31/2024 for findings on his chest CT scan     His cough has resolved    He has atherosclerosis and ordered CT coronary calcium score CT scan today     CT Chest w/o Contrast  Narrative: CT chest without contrast    INDICATION: Screening cholesterol level. Anemia.    COMPARISON: No prior CT chest. Plain films 11/9/2024. FINDINGS: No  contrast. The included thyroid appears unremarkable. Mild  atherosclerotic calcification in multiple coronary arteries and in the  thoracic aorta trace calcification in the upper abdominal aorta.  Calcification in the right kidney measuring approximately 5 mm which  may represent a nonobstructing stone. Adrenals normal. No  hydronephrosis possible anterior right renal cyst at the upper pole.  Mild fatty pancreatic atrophy.  No pleural or pericardial effusion. No enlarged lymph nodes in the  chest. Heart size normal. Pulmonary artery caliber normal. Thoracic  aortic caliber normal.  Bone detail shows multilevel degenerative osteophyte formation in the  thoracic spine and lower cervical spine and upper lumbar spine. No  suspicious sclerotic or lytic/destructive lesion.    Detail of the lungs shows punctate calcified right lateral apical  granuloma. Endobronchial 3 mm nodule in the right upper lobe (4/111).  Another endobronchial right middle lobe nodule (4/156) also measures 3  mm. An additional endobronchial right middle lobe nodule in the right  middle lobe (4/169) 3 punctate calcified right lower lobe granuloma  laterally. Distal endobronchial right middle lobe anterior nodule  measures 4 mm (4/195). 2 mm right middle lobe endobronchial nodule  (4/189). 3 mm right lower lobe parenchymal nodule (4/206).  Calcified  medial right lower lobe granulomas. Punctate calcified granuloma left  lower lobe adjacent to the major fissure. No other left lung  opacities. Major airways centrally appear nicely patent.  Impression: IMPRESSION: Incidental endobronchial nodules in the right upper and  middle lobes a 3 mm parenchymal right lower lobe nodule an other  granulomas. Recommend 3 to 6= month follow-up. Additional benign  granulomas. Atherosclerosis. Right renal cyst. Diffuse degenerative  changes in the spine.    LUDA MONGE MD         SYSTEM ID:  [id number]    Recent Results (from the past 720 hours)   Iron and iron binding capacity    Collection Time: 11/09/24  9:58 AM   Result Value Ref Range    Iron 72 61 - 157 ug/dL    Iron Binding Capacity 310 240 - 430 ug/dL    Iron Sat Index 23 15 - 46 %   Ferritin    Collection Time: 11/09/24  9:58 AM   Result Value Ref Range    Ferritin 76 31 - 409 ng/mL   Vitamin B12    Collection Time: 11/09/24  9:58 AM   Result Value Ref Range    Vitamin B12 596 232 - 1,245 pg/mL   Folate    Collection Time: 11/09/24  9:58 AM   Result Value Ref Range    Folic Acid 11.3 4.6 - 34.8 ng/mL   PSA, screen    Collection Time: 11/09/24  9:58 AM   Result Value Ref Range    Prostate Specific Antigen Screen 1.49 0.00 - 6.50 ng/mL   Lipid panel reflex to direct LDL Fasting    Collection Time: 11/09/24  9:58 AM   Result Value Ref Range    Cholesterol 205 (H) <200 mg/dL    Triglycerides 121 <150 mg/dL    Direct Measure HDL 47 >=40 mg/dL    LDL Cholesterol Calculated 134 (H) <100 mg/dL    Non HDL Cholesterol 158 (H) <130 mg/dL    Patient Fasting > 8hrs? Unknown    CBC with platelets and differential    Collection Time: 11/09/24  9:58 AM   Result Value Ref Range    WBC Count 7.1 4.0 - 11.0 10e3/uL    RBC Count 4.49 4.40 - 5.90 10e6/uL    Hemoglobin 13.6 13.3 - 17.7 g/dL    Hematocrit 40.4 40.0 - 53.0 %    MCV 90 78 - 100 fL    MCH 30.3 26.5 - 33.0 pg    MCHC 33.7 31.5 - 36.5 g/dL    RDW 12.0 10.0 - 15.0 %     Platelet Count 215 150 - 450 10e3/uL    % Neutrophils 44 %    % Lymphocytes 44 %    % Monocytes 8 %    % Eosinophils 4 %    % Basophils 1 %    % Immature Granulocytes 0 %    NRBCs per 100 WBC 0 <1 /100    Absolute Neutrophils 3.1 1.6 - 8.3 10e3/uL    Absolute Lymphocytes 3.1 0.8 - 5.3 10e3/uL    Absolute Monocytes 0.6 0.0 - 1.3 10e3/uL    Absolute Eosinophils 0.3 0.0 - 0.7 10e3/uL    Absolute Basophils 0.1 0.0 - 0.2 10e3/uL    Absolute Immature Granulocytes 0.0 <=0.4 10e3/uL    Absolute NRBCs 0.0 10e3/uL   EKG 12-lead complete w/read - Clinics    Collection Time: 11/09/24 10:35 AM   Result Value Ref Range    Systolic Blood Pressure  mmHg    Diastolic Blood Pressure  mmHg    Ventricular Rate 69 BPM    Atrial Rate 69 BPM    DC Interval 198 ms    QRS Duration 88 ms     ms    QTc 439 ms    P Axis 60 degrees    R AXIS -16 degrees    T Axis 53 degrees    Interpretation ECG       Sinus rhythm  Normal ECG  No previous ECGs available  Unconfirmed report - this is a computer generated report only - please see the primary care center for final interpretation  Confirmed by - [facility], PHYSICIAN ([id number]),  MEÑO NORMAN ([id number]) on 11/10/2024 2:27:19 PM     General PFT Lab (Please always keep checked)    Collection Time: 11/12/24  1:18 PM   Result Value Ref Range    FVC-Pred 3.60 L    FVC-Pre 3.75 L    FVC-%Pred-Pre 104 %    FEV1-Pre 3.11 L    FEV1-%Pred-Pre 112 %    FEV1FVC-Pred 77 %    FEV1FVC-Pre 83 %    FEFMax-Pred 7.77 L/sec    FEFMax-Pre 8.50 L/sec    FEFMax-%Pred-Pre 109 %    FEF2575-Pred 2.16 L/sec    FEF2575-Pre 3.80 L/sec    TND8401-%Pred-Pre 176 %    ExpTime-Pre 7.70 sec    FIFMax-Pre 6.48 L/sec    VC-Pred 3.95 L    VC-Pre 3.97 L    VC-%Pred-Pre 100 %    IC-Pred 2.67 L    IC-Pre 2.99 L    IC-%Pred-Pre 111 %    ERV-Pred 1.33 L    ERV-Pre 0.98 L    ERV-%Pred-Pre 73 %    FEV1FEV6-Pred 77 %    FEV1FEV6-Pre 83 %    FRCPleth-Pred 3.59 L    FRCPleth-Pre 3.25 L    FRCPleth-%Pred-Pre 90 %    RVPleth-Pred  2.60 L    RVPleth-Pre 2.27 L    RVPleth-%Pred-Pre 87 %    TLCPleth-Pred 6.72 L    TLCPleth-Pre 6.24 L    TLCPleth-%Pred-Pre 92 %    DLCOunc-Pred 24.21 ml/min/mmHg    DLCOunc-Pre 25.34 ml/min/mmHg    DLCOunc-%Pred-Pre 104 %    DLCOcor-Pre 26.11 ml/min/mmHg    DLCOcor-%Pred-Pre 107 %    VA-Pre 5.80 L    VA-%Pred-Pre 97 %    FEV1SVC-Pred 70 %    FEV1SVC-Pre 78 %

## 2024-12-09 ENCOUNTER — VIRTUAL VISIT (OUTPATIENT)
Dept: INTERNAL MEDICINE | Facility: CLINIC | Age: 69
End: 2024-12-09
Payer: COMMERCIAL

## 2024-12-09 DIAGNOSIS — R91.8 PULMONARY NODULES: Primary | ICD-10-CM

## 2024-12-09 DIAGNOSIS — R93.1 ELEVATED CORONARY ARTERY CALCIUM SCORE: Primary | ICD-10-CM

## 2024-12-09 PROBLEM — K43.9 HERNIA OF ANTERIOR ABDOMINAL WALL: Status: ACTIVE | Noted: 2024-12-09

## 2024-12-09 PROBLEM — R97.20 ABNORMAL PROSTATE SPECIFIC ANTIGEN (PSA): Status: ACTIVE | Noted: 2024-12-09

## 2024-12-09 PROBLEM — S02.2XXA FRACTURE OF NASAL SEPTUM: Status: ACTIVE | Noted: 2024-12-09

## 2024-12-09 PROBLEM — R25.2 SPASM: Status: ACTIVE | Noted: 2024-12-09

## 2024-12-09 PROBLEM — E78.00 PURE HYPERCHOLESTEROLEMIA: Status: ACTIVE | Noted: 2024-12-09

## 2024-12-09 PROBLEM — R11.0 NAUSEA: Status: ACTIVE | Noted: 2024-12-09

## 2024-12-09 PROBLEM — N41.9 PROSTATITIS: Status: ACTIVE | Noted: 2024-12-09

## 2024-12-09 RX ORDER — ASPIRIN 81 MG/1
81 TABLET ORAL DAILY
COMMUNITY

## 2024-12-09 RX ORDER — ETODOLAC 400 MG/1
400 TABLET, FILM COATED ORAL
COMMUNITY

## 2024-12-09 NOTE — PROGRESS NOTES
Pre-visit planning and chart review completed.     NEW patient appointment:    12/31 with Dr. Brennon Leonardo  11/29 CT chest wo contrast    - On Aspirin.  - Never smoker.    CE updated. Medications, allergies, problem list, and immunizations reconciled.

## 2024-12-09 NOTE — PATIENT INSTRUCTIONS
Thank you for visiting the Primary Care Center today at the [facility]! The following is some information about our clinic:     Primary Care Center Frequently-Asked Questions    (1) How do I schedule appointments at the [facility]?     Primary Care--to schedule or make changes to an existing appointment, please call our primary care line at [phone].    Labs--to schedule a lab appointment at the [facility] you can use [employer] or call [phone]. If you have a [facility] location that is closer to home, you can reach out to that location for scheduling options.     Imaging--if you need to schedule a CT, X-ray, MRI, ultrasound, or other imaging study you can call [phone] to schedule at the [facility] or any other [facility] imaging location.     Referrals--if a referral to another specialty was ordered you can expect a phone call from their scheduling team. If you have not heard from them in a week, please call us or send us a [employer] message to check the status or get a scheduling number. Please note that this only applies to internal [facility] referrals. If the referral is external you would need to contact their office for scheduling.     (2) I have a question about my visit, who do I contact?     You can call us at the primary care line at [phone] to ask questions about your visit. You can also send a secure message through [employer], which is reviewed by clinic staff. Please note that [employer] messages have a twenty-four to forty-eight business hour turnaround time and should not be used for urgent concerns.    (3) How will I get the results of my tests?    If you are signed up for [email or web address] all tests will be released to you within twenty-four hours of resulting. Please allow three to five days for your doctor to review your results and place a note interpreting the results. If you do not have [employer] you will receive your  results through mail seven to ten business days following the return of the tests. Please note that if there should be any urgent or concerning results that your doctor or their registered nurse will reach out to you the same day as the tests come back. If you have follow up questions about your results or would like to discuss the results in detail please schedule a follow up with your provider either in person or virtually.     (4) How do I get refills of my prescriptions?     You should always first contact your pharmacy for refills of your medications. If submitting a refill request on [employer], please be sure to submit the request only once--repeat requests can cause delays in refill. If you are requesting a NEW medication or a medication related to new symptoms you will need to schedule an appointment with a provider prior to approval. Please note: Routine medication refills have up to one to three business day turnaround whereas controlled substances refills have up to five to seven business day turnaround.    (5) I have new symptoms, what do I do?     If you are having an immediate medical emergency, you should dial 911 for assistance.   For anything urgent that needs to be seen within a few hours to one day you should visit a local urgent care for assistance.  For non-urgent symptoms that need to be seen within a few days to a week you can schedule with an available provider in primary care by going to [employer] or calling [phone].   If you are not sure how serious your symptoms are or you would like to receive medical advice you can always call [phone] to speak with a triage nurse.

## 2024-12-10 ENCOUNTER — TELEPHONE (OUTPATIENT)
Dept: INTERNAL MEDICINE | Facility: CLINIC | Age: 69
End: 2024-12-10
Payer: COMMERCIAL

## 2024-12-10 NOTE — TELEPHONE ENCOUNTER
RECORDS STATUS - ALL OTHER DIAGNOSIS      RECORDS RECEIVED FROM: [facility]   NOTES STATUS DETAILS   OFFICE NOTE from referring provider Epic 11/9/2024 - Dr. Arian Paredes   MEDICATION LIST [facility]    LABS     PATHOLOGY REPORTS     ANYTHING RELATED TO DIAGNOSIS Epic 11/9/2024   IMAGING (NEED IMAGES & REPORT)     CT SCANS PACS CT Chest: 11/29/2024   XRAYS PACS Xray Chest: 11/9/2024, 10/31/2019

## 2024-12-10 NOTE — TELEPHONE ENCOUNTER
Spoke with the patient to schedule per check out:   (1) future CT coronary calcium score   (2) future labs   Warm transferred to an imaging coordinator to schedule

## 2024-12-21 ENCOUNTER — LAB (OUTPATIENT)
Dept: LAB | Facility: CLINIC | Age: 69
End: 2024-12-21
Payer: COMMERCIAL

## 2024-12-21 DIAGNOSIS — R93.1 ELEVATED CORONARY ARTERY CALCIUM SCORE: ICD-10-CM

## 2024-12-21 LAB
ALBUMIN SERPL BCG-MCNC: 4.3 G/DL (ref 3.5–5.2)
ALP SERPL-CCNC: 75 U/L (ref 40–150)
ALT SERPL W P-5'-P-CCNC: 19 U/L (ref 0–70)
ANION GAP SERPL CALCULATED.3IONS-SCNC: 8 MMOL/L (ref 7–15)
AST SERPL W P-5'-P-CCNC: 21 U/L (ref 0–45)
BILIRUB SERPL-MCNC: 0.2 MG/DL
BUN SERPL-MCNC: 15.2 MG/DL (ref 8–23)
CALCIUM SERPL-MCNC: 9.1 MG/DL (ref 8.8–10.4)
CHLORIDE SERPL-SCNC: 104 MMOL/L (ref 98–107)
CREAT SERPL-MCNC: 1.04 MG/DL (ref 0.67–1.17)
EGFRCR SERPLBLD CKD-EPI 2021: 77 ML/MIN/1.73M2
GLUCOSE SERPL-MCNC: 113 MG/DL (ref 70–99)
HCO3 SERPL-SCNC: 28 MMOL/L (ref 22–29)
POTASSIUM SERPL-SCNC: 4 MMOL/L (ref 3.4–5.3)
PROT SERPL-MCNC: 6.7 G/DL (ref 6.4–8.3)
SODIUM SERPL-SCNC: 140 MMOL/L (ref 135–145)

## 2024-12-21 PROCEDURE — 80053 COMPREHEN METABOLIC PANEL: CPT | Performed by: PATHOLOGY

## 2024-12-21 PROCEDURE — 36415 COLL VENOUS BLD VENIPUNCTURE: CPT | Performed by: PATHOLOGY

## 2024-12-31 ENCOUNTER — TELEPHONE (OUTPATIENT)
Dept: INTERNAL MEDICINE | Facility: CLINIC | Age: 69
End: 2024-12-31

## 2024-12-31 ENCOUNTER — VIRTUAL VISIT (OUTPATIENT)
Dept: PULMONOLOGY | Facility: CLINIC | Age: 69
End: 2024-12-31
Attending: INTERNAL MEDICINE
Payer: COMMERCIAL

## 2024-12-31 ENCOUNTER — HOSPITAL ENCOUNTER (OUTPATIENT)
Dept: CT IMAGING | Facility: CLINIC | Age: 69
Discharge: HOME | End: 2024-12-31
Attending: INTERNAL MEDICINE
Payer: COMMERCIAL

## 2024-12-31 ENCOUNTER — PRE VISIT (OUTPATIENT)
Dept: PULMONOLOGY | Facility: CLINIC | Age: 69
End: 2024-12-31
Payer: COMMERCIAL

## 2024-12-31 VITALS — HEIGHT: 68 IN | BODY MASS INDEX: 25.01 KG/M2 | WEIGHT: 165 LBS

## 2024-12-31 DIAGNOSIS — E78.00 HIGH BLOOD CHOLESTEROL: Primary | ICD-10-CM

## 2024-12-31 DIAGNOSIS — R93.1 ELEVATED CORONARY ARTERY CALCIUM SCORE: ICD-10-CM

## 2024-12-31 DIAGNOSIS — R91.8 PULMONARY NODULES: ICD-10-CM

## 2024-12-31 PROCEDURE — 99204 OFFICE O/P NEW MOD 45 MIN: CPT | Mod: 95 | Performed by: INTERNAL MEDICINE

## 2024-12-31 PROCEDURE — 75571 CT HRT W/O DYE W/CA TEST: CPT

## 2024-12-31 ASSESSMENT — PAIN SCALES - GENERAL: PAINLEVEL_OUTOF10: NO PAIN (0)

## 2024-12-31 NOTE — TELEPHONE ENCOUNTER
Deasultana Luciano;    Please see results note I sent to Mr. Alexandria FERRARO    Dear Guerrero;    Your CT coronary calcium scan shows coronary calcification and recommend you start on a statin medication. You can call [phone] and speak with our nursing staff, Mustapha Lynn if you are interested in this.    MARY Paredes MD   33.5

## 2024-12-31 NOTE — NURSING NOTE
Current patient location: [address] [place] [address]    Is the patient currently in the state of [place]? YES    Visit mode:VIDEO    If the visit is dropped, the patient can be reconnected by:VIDEO VISIT: Text to cell phone:   Telephone Information:   Mobile [phone]    and VIDEO VISIT: Send to e-mail at: [email or web address]    Will anyone else be joining the visit? NO  (If patient encounters technical issues they should call [phone]    Are changes needed to the allergy or medication list? No    Are refills needed on medications prescribed by this physician? NO    Rooming Documentation:  Not applicable    Reason for visit: Consult    Jennifer DARDEN

## 2024-12-31 NOTE — PROGRESS NOTES
"    LUNG NODULE & INTERVENTIONAL PULMONARY CLINIC  CLINICS [facility] SURGERY CENTER, [facility], [facility] CANCER [address] [address]  Phone: [phone]  Fax: [phone]    Patient:  Guerrero Ibrahim, Age 69 year old, Date of birth 1955, MRN# [id number]  Date of Visit:  12/31/2024  Referring Provider Arian Paredes    Reason for Consultation: Lung Nodule     The patient has been notified of following:   \"This video visit will be conducted via a call between you and your physician/provider. We have found that certain health care needs can be provided without the need for an in-person physical exam.  This service lets us provide the care you need with a video conversation.  If a prescription is necessary we can send it directly to your pharmacy.  If lab work is needed we can place an order for that and you can then stop by our lab to have the test done at a later time.  Video visits are billed at different rates depending on your insurance coverage.  Please reach out to your insurance provider with any questions.  If during the course of the call the physician/provider feels a video visit is not appropriate, you will not be charged for this service.\"  Patient has given verbal consent for Video visit? Yes  How would you like to obtain your AVS? Please refer to rooming staff note  Patient would like the video invitation sent by: Please refer to rooming staff note   Will anyone else be joining your video visit? Please refer to rooming staff note    Video-Visit Details     Type of service:  Video Visit  Video Start Time: 330  Video End Time: 337  Provider Name: Brennon Leonardo MD, A   Originating Location (pt. Location): Home  Provider Location: Off campus   Distant Location (provider location): Home/Clinic  Platform used for Video Visit: AmWell/Doximity    Assessment and Plan:    1. New multiple pulmonary lung nodule(s) and " enodbronchial filling defect. Given the characteristics on current/previous imaging and risk factors; I would classify this to be Intermediate (6-65%) risk for cancer. Small left lung 3mm nodule is not concerning with his low risk history. The RUL has 2 areas of endobronchial filling defects unclear if its pathological vs artifact. Regardless, the filling defects are too peripheral for direct visualization. Plan to cont surveillance. Next CT in 3mo    Billing: I spent a total of 45min spent on date of encounter which includes prep time, visit with the patient and post visit work including documentation and nursing communication     Brennon Leonardo MD, MHA  Associate Professor of Medicine  Section of Interventional Pulmonology   Division of Pulmonary, Allergy, Critical Care and Sleep Medicine   [facility]  Pager: [phone]   Office: [phone]  Email: [email or web address]    Farida Roldan RN   Interventional Pulmonary Care Coordinator   Office: [phone]  Email: [email or web address]     Koko Desai  Interventional Pulmonary Surgery Scheduler   Office: [phone]  Email: [email or web address]         History:     Guerrero Ibrahim is a 69 year old male who is here for consultation regarding Lung Nodule.    - No new resp sx or complaints. Denies dyspnea or cough.   - here for incidental lung nodule finding  - Personal hx of cancer: no  - Family hx of cancer: no  - Exposure hx: Denies asbestos or radon exposure   - Tobacco hx: never smoker   - My interpretation of the images relevant for this visit includes: nodule   - My interpretation of the PFT's relevant for this visit includes: None     Culprit Nodule(s):   Incidental endobronchial nodules in the right upper and middle lobes   3 mm parenchymal right lower lobe nodule an other granulomas.    Other active medical problems include:   - NA          Past Medical History:    No past medical history on file.           Past Surgical History:      Past Surgical History:   Procedure Laterality Date    APPENDECTOMY  1959    COLONOSCOPY  2003,2008,2013, 2019    COLONOSCOPY N/A 5/23/2024    Procedure: COLONOSCOPY, WITH POLYPECTOMY AND BIOPSY;  Surgeon: Pola Cedeño MD;  Location:  GI    ORTHOPEDIC SURGERY Right     trigger finger x2 in 2000's    SALIVARY GLAND SURGERY      removal 1998    wisdom teeth              Social History:     Social History     Tobacco Use    Smoking status: Never    Smokeless tobacco: Never    Tobacco comments:     never smoked   Substance Use Topics    Alcohol use: Yes     Comment: 1 beer or glass of wine per week            Family History:     Family History   Problem Relation Age of Onset    Hypertension Father     Cerebrovascular Disease Father     Diabetes Sister     Melanoma No family hx of              Allergies:      Allergies   Allergen Reactions    Percocet [Oxycodone-Acetaminophen] Nausea and Vomiting            Medications:     Current Outpatient Medications   Medication Sig Dispense Refill    finasteride (PROSCAR) 5 MG tablet TAKE 1 TABLET BY MOUTH EVERY DAY 90 tablet 3    methylcellulose (CITRUCEL) powder       tamsulosin (FLOMAX) 0.4 MG capsule Take 1 capsule (0.4 mg) by mouth daily. 90 capsule 2    vitamin D3 (CHOLECALCIFEROL) 2000 units (50 mcg) tablet Take 1 tablet by mouth daily      aspirin 81 MG EC tablet Take 81 mg by mouth daily.      etodolac (LODINE) 400 MG tablet Take 400 mg by mouth.       No current facility-administered medications for this visit.            Review of Systems:     12-point ROS reviewed and abnormalities stated in the history.         Physical Exam:     Constitutional - looks well, in no apparent distress  Eyes - no redness or discharge  Respiratory -breathing appears comfortable.  No cough.  Skin - No appreciable discoloration or lesions (very limited exam)  Neurological - No apparent tremors. Speech fluent and articlate  Psychiatric - no signs of  delirium or anxiety     Exam limited to that easily identified on a virtual visit. The rest of a comprehensive physical examination is deferred due to PHE (public health emergency) video visit restrictions.         Current Laboratory Data:   All laboratory and imaging data reviewed.          Latest Ref Rng & Units 11/12/2024     1:18 PM   PFT   FVC L 3.75    FEV1 L 3.11    FVC% % 104    FEV1% % 112

## 2024-12-31 NOTE — LETTER
"12/31/2024       RE: Guerrero Ibrahim  [address]  [place] [address]     Dear Colleague,    Thank you for referring your patient, Guerrero Ibrahim, to the [facility] CANCER [facility]INIC at [facility]. Please see a copy of my visit note below.    Virtual Visit Details    Type of service:  Video Visit   See note        LUNG NODULE & INTERVENTIONAL PULMONARY CLINIC  CLINICS & SURGERY CENTER, [facility], [facility] CANCER CLINIC  [address] [place] [address]  Phone: [phone]  Fax: [phone]    Patient:  Guerrero Ibrahim, Age 69 year old, Date of birth 1955, MRN# [id number]  Date of Visit:  12/31/2024  Referring Provider Arian Paredes    Reason for Consultation: Lung Nodule     The patient has been notified of following:   \"This video visit will be conducted via a call between you and your physician/provider. We have found that certain health care needs can be provided without the need for an in-person physical exam.  This service lets us provide the care you need with a video conversation.  If a prescription is necessary we can send it directly to your pharmacy.  If lab work is needed we can place an order for that and you can then stop by our lab to have the test done at a later time.  Video visits are billed at different rates depending on your insurance coverage.  Please reach out to your insurance provider with any questions.  If during the course of the call the physician/provider feels a video visit is not appropriate, you will not be charged for this service.\"  Patient has given verbal consent for Video visit? Yes  How would you like to obtain your AVS? Please refer to rooming staff note  Patient would like the video invitation sent by: Please refer to rooming staff note   Will anyone else be joining your video visit? Please refer to rooming staff " note    Video-Visit Details     Type of service:  Video Visit  Video Start Time: 330  Video End Time: 337  Provider Name: Brennon Leonardo MD, MHA   Originating Location (pt. Location): Home  Provider Location: Off campus   Distant Location (provider location): Home/Clinic  Platform used for Video Visit: [facility]    Assessment and Plan:    1. New multiple pulmonary lung nodule(s) and enodbronchial filling defect. Given the characteristics on current/previous imaging and risk factors; I would classify this to be Intermediate (6-65%) risk for cancer. Small left lung 3mm nodule is not concerning with his low risk history. The RUL has 2 areas of endobronchial filling defects unclear if its pathological vs artifact. Regardless, the filling defects are too peripheral for direct visualization. Plan to cont surveillance. Next CT in 3mo    Billing: I spent a total of 45min spent on date of encounter which includes prep time, visit with the patient and post visit work including documentation and nursing communication     Brennon Leonardo MD, MHA  Associate Professor of Medicine  Section of Interventional Pulmonology   Division of Pulmonary, Allergy, Critical Care and Sleep Medicine   [facility]  Pager: [phone]   Office: [phone]  Email: [email or web address]    Farida Roldan RN   Interventional Pulmonary Care Coordinator   Office: [phone]  Email: [email or web address]     Koko Desai  Interventional Pulmonary Surgery Scheduler   Office: [phone]  Email: [email or web address]         History:     Guerrero Ibrahim is a 69 year old male who is here for consultation regarding Lung Nodule.    - No new resp sx or complaints. Denies dyspnea or cough.   - here for incidental lung nodule finding  - Personal hx of cancer: no  - Family hx of cancer: no  - Exposure hx: Denies asbestos or radon exposure   - Tobacco hx: never smoker   - My interpretation of the images relevant for  this visit includes: nodule   - My interpretation of the PFT's relevant for this visit includes: None     Culprit Nodule(s):   Incidental endobronchial nodules in the right upper and middle lobes   3 mm parenchymal right lower lobe nodule an other granulomas.    Other active medical problems include:   - NA          Past Medical History:    No past medical history on file.          Past Surgical History:      Past Surgical History:   Procedure Laterality Date     APPENDECTOMY  1959     COLONOSCOPY  2003,2008,2013, 2019     COLONOSCOPY N/A 5/23/2024    Procedure: COLONOSCOPY, WITH POLYPECTOMY AND BIOPSY;  Surgeon: Pola Cedeño MD;  Location:  GI     ORTHOPEDIC SURGERY Right     trigger finger x2 in 2000's     SALIVARY GLAND SURGERY      removal 1998     wisdom teeth              Social History:     Social History     Tobacco Use     Smoking status: Never     Smokeless tobacco: Never     Tobacco comments:     never smoked   Substance Use Topics     Alcohol use: Yes     Comment: 1 beer or glass of wine per week            Family History:     Family History   Problem Relation Age of Onset     Hypertension Father      Cerebrovascular Disease Father      Diabetes Sister      Melanoma No family hx of              Allergies:      Allergies   Allergen Reactions     Percocet [Oxycodone-Acetaminophen] Nausea and Vomiting            Medications:     Current Outpatient Medications   Medication Sig Dispense Refill     finasteride (PROSCAR) 5 MG tablet TAKE 1 TABLET BY MOUTH EVERY DAY 90 tablet 3     methylcellulose (CITRUCEL) powder        tamsulosin (FLOMAX) 0.4 MG capsule Take 1 capsule (0.4 mg) by mouth daily. 90 capsule 2     vitamin D3 (CHOLECALCIFEROL) 2000 units (50 mcg) tablet Take 1 tablet by mouth daily       aspirin 81 MG EC tablet Take 81 mg by mouth daily.       etodolac (LODINE) 400 MG tablet Take 400 mg by mouth.       No current facility-administered medications for this visit.            Review of  Systems:     12-point ROS reviewed and abnormalities stated in the history.         Physical Exam:     Constitutional - looks well, in no apparent distress  Eyes - no redness or discharge  Respiratory -breathing appears comfortable.  No cough.  Skin - No appreciable discoloration or lesions (very limited exam)  Neurological - No apparent tremors. Speech fluent and articlate  Psychiatric - no signs of delirium or anxiety     Exam limited to that easily identified on a virtual visit. The rest of a comprehensive physical examination is deferred due to PHE (public health emergency) video visit restrictions.         Current Laboratory Data:   All laboratory and imaging data reviewed.          Latest Ref Rng & Units 11/12/2024     1:18 PM   PFT   FVC L 3.75    FEV1 L 3.11    FVC% % 104    FEV1% % 112                        Again, thank you for allowing me to participate in the care of your patient.      Sincerely,    Brennon Leonardo MD

## 2025-01-07 NOTE — TELEPHONE ENCOUNTER
Patient received letter and wants to talk to Mustapha about it. Please call him back and advise. During office hours, please call [phone] first and that goes directly to Guerrero, or his cell at [phone].

## 2025-01-08 RX ORDER — ATORVASTATIN CALCIUM 10 MG/1
10 TABLET, FILM COATED ORAL DAILY
Qty: 90 TABLET | Refills: 2 | Status: SHIPPED | OUTPATIENT
Start: 2025-01-08

## 2025-01-08 NOTE — TELEPHONE ENCOUNTER
Called patient- explained results. Let him know statin therapy is recommended, he is agreeable to start atorvastatin. He is wondering how this will affect his CA score- He asked how this affects his coronary calcium. I let him know we might not see an improvement in his CAC score, but would see improvement in total cholesterol and LDL, as well as prevent further plaque from forming in vessels and improve risk for future MI or stroke. Explained to report GI upset or myalgias after starting medication. Patient would like it sent to [facility] on [address] Snehal Lynn RN on 1/8/2025 at 1:25 PM

## 2025-02-06 ENCOUNTER — OFFICE VISIT (OUTPATIENT)
Dept: DERMATOLOGY | Facility: CLINIC | Age: 70
End: 2025-02-06
Payer: COMMERCIAL

## 2025-02-06 DIAGNOSIS — Z12.83 SKIN CANCER SCREENING: Primary | ICD-10-CM

## 2025-02-06 DIAGNOSIS — L82.1 SEBORRHEIC KERATOSIS: ICD-10-CM

## 2025-02-06 DIAGNOSIS — L82.0 SEBORRHEIC KERATOSES, INFLAMED: ICD-10-CM

## 2025-02-06 PROCEDURE — 1126F AMNT PAIN NOTED NONE PRSNT: CPT | Performed by: DERMATOLOGY

## 2025-02-06 PROCEDURE — 99213 OFFICE O/P EST LOW 20 MIN: CPT | Mod: 25 | Performed by: DERMATOLOGY

## 2025-02-06 PROCEDURE — 17110 DESTRUCTION B9 LES UP TO 14: CPT | Performed by: DERMATOLOGY

## 2025-02-06 ASSESSMENT — PAIN SCALES - GENERAL: PAINLEVEL_OUTOF10: NO PAIN (0)

## 2025-02-06 NOTE — NURSING NOTE
Dermatology Rooming Note    Guerrero Ibrahim's goals for this visit include:   Chief Complaint   Patient presents with    Skin Check     FBSE:  Areas of concern = rough area on scalp     Roxy Duran LPN

## 2025-02-06 NOTE — LETTER
2/6/2025       RE: Guerrero Ibrahim  [address]  [place] [address]     Dear Colleague,    Thank you for referring your patient, Guerrero Ibrahim, to the [facility] [place] at [facility]. Please see a copy of my visit note below.    [facility] Dermatology Note  Encounter Date: Feb 6, 2025     Dermatology Problem List:  #. AK of the vertex scalp   -S/p cryotherapy on 10/11/2022  #. Verrucous keratoses  Biopsied in 2020  #. SK of the right cheek.   #. Solar lentigines and benign appearing nevi  ____________________________________________     Assessment & Plan:   1.  History of irritated verrucous keratosis, based on biopsy in 2020.  Clinically, there is no evidence of recurrence today.  He was reassured that there is no concern for malignancy in this area today.  2.  Seborrheic keratosis of the right cheek.  Reassurance was provided.  3.  Solar lentigines and benign appearing nevi.  He was reassured that these lesions were benign.  We discussed the importance of ongoing high SPF sunscreen and other sun protective behaviors.  5. 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  Persistent today, inflamed on exam.  We agreed on the following plan:  - Trial of cryotherapy: after informed verbal consent, treated one lesion with LN2 x 10 seconds x 2 cycles.  Patient tolerated without complication  - If persistent/recurrent at 1 yr followup, with biopsy with intent to remove     He will follow up in our clinic in 1 year's time.     Rajinder Pulido MD  Dermatology Attending  ____________________________________________     CC: followup skin check     HPI:  Mr. Guerrero Ibrahim is a(n) 69 year old male who presents today as a followup patient for skin check.     He was last seen in clinic with a benign skin check on 9/23/23.     Since then he has not had any obvious new or changing moles, and  no nonhealing sores.  He has a persistent scaly spot in the area of biopsy (VK) performed at last visit.       REVIEW OF SYSTEMS:  No recent fevers.     PHYSICAL EXAMINATION:    GENERAL:  This is a well-appearing, well-nourished male with a normal mood and affect, who is oriented x3.  SKIN:  A cutaneous exam of the head, neck, chest, abdomen, back, bilateral upper and lower extremities and buttocks was performed.  On the bilateral cheeks, there are scattered 1 mm light tan macules consistent with solar lentigines.  On the right zygomatic process, there is a 2 mm uniformly brown flat topped papule.  On the back, there are scattered 1-2 mm pigmented flat topped papules consistent with benign nevi.  On the superior shoulder, there is a white-pink flat topped papule consistent with scar.  On the vertex scalp there is a rough scaly 3mm flat topped papule.      Again, thank you for allowing me to participate in the care of your patient.      Sincerely,    Rajinder Pulido MD

## 2025-02-06 NOTE — PROGRESS NOTES
[facility] Dermatology Note  Encounter Date: Feb 6, 2025     Dermatology Problem List:  #. AK of the vertex scalp   -S/p cryotherapy on 10/11/2022  #. Verrucous keratoses  Biopsied in 2020  #. SK of the right cheek.   #. Solar lentigines and benign appearing nevi        ____________________________________________     Assessment & Plan:   1.  History of irritated verrucous keratosis, based on biopsy in 2020.  Clinically, there is no evidence of recurrence today.  He was reassured that there is no concern for malignancy in this area today.  2.  Seborrheic keratosis of the right cheek.  Reassurance was provided.  3.  Solar lentigines and benign appearing nevi.  He was reassured that these lesions were benign.  We discussed the importance of ongoing high SPF sunscreen and other sun protective behaviors.  5. 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  Persistent today.  We agreed on the following plan:  - Trial of cryotherapy: after informed verbal consent, treated one lesion with LN2 x 10 seconds x 2 cycles  - If persistent/recurrent at 1 yr followup, with biopsy with intent to remove     He will follow up in our clinic in 1 year's time.     Rajinder Pulido MD  Dermatology Attending  ____________________________________________     CC: followup skin check     HPI:  Mr. Guerrero Ibrahim is a(n) 69 year old male who presents today as a followup patient for skin check.     He was last seen in clinic with a benign skin check on 9/23/23.     Since then he has not had any obvious new or changing moles, and no nonhealing sores.  He has a persistent scaly spot in the area of biopsy (VK) performed at last visit.       REVIEW OF SYSTEMS:  No recent fevers.     PHYSICAL EXAMINATION:    GENERAL:  This is a well-appearing, well-nourished male with a normal mood and affect, who is oriented x3.  SKIN:  A cutaneous exam of the head, neck, chest, abdomen, back, bilateral  upper and lower extremities and buttocks was performed.  On the bilateral cheeks, there are scattered 1 mm light tan macules consistent with solar lentigines.  On the right zygomatic process, there is a 2 mm uniformly brown flat topped papule.  On the back, there are scattered 1-2 mm pigmented flat topped papules consistent with benign nevi.  On the superior shoulder, there is a white-pink flat topped papule consistent with scar.  On the vertex scalp there is a rough scaly 3mm flat topped papule.

## 2025-02-20 DIAGNOSIS — N40.0 BENIGN PROSTATIC HYPERPLASIA WITHOUT LOWER URINARY TRACT SYMPTOMS: ICD-10-CM

## 2025-02-27 RX ORDER — TAMSULOSIN HYDROCHLORIDE 0.4 MG/1
0.4 CAPSULE ORAL DAILY
Qty: 90 CAPSULE | Refills: 2 | Status: SHIPPED | OUTPATIENT
Start: 2025-02-27

## 2025-03-01 PROBLEM — L82.0 SEBORRHEIC KERATOSES, INFLAMED: Status: ACTIVE | Noted: 2022-11-05

## 2025-03-01 PROBLEM — L82.1 SEBORRHEIC KERATOSIS: Status: ACTIVE | Noted: 2022-11-05

## 2025-03-15 ENCOUNTER — HEALTH MAINTENANCE LETTER (OUTPATIENT)
Age: 70
End: 2025-03-15

## 2025-04-14 ENCOUNTER — DOCUMENTATION ONLY (OUTPATIENT)
Dept: PULMONOLOGY | Facility: CLINIC | Age: 70
End: 2025-04-14
Payer: COMMERCIAL

## 2025-04-14 NOTE — NURSING NOTE
Pre-visit planning and chart review completed.     RETURN patient appointment:    4/22 with Dr. Brennon Leonardo  4/15 CT chest wo contrast    - 3 month follow-up .    CE updated. Medications, allergies, problem list, and immunizations reconciled.

## 2025-04-15 ENCOUNTER — ANCILLARY PROCEDURE (OUTPATIENT)
Dept: CT IMAGING | Facility: CLINIC | Age: 70
End: 2025-04-15
Attending: INTERNAL MEDICINE
Payer: COMMERCIAL

## 2025-04-15 DIAGNOSIS — R91.8 PULMONARY NODULES: ICD-10-CM

## 2025-04-15 PROCEDURE — 71250 CT THORAX DX C-: CPT | Performed by: RADIOLOGY

## 2025-04-22 ENCOUNTER — VIRTUAL VISIT (OUTPATIENT)
Dept: PULMONOLOGY | Facility: CLINIC | Age: 70
End: 2025-04-22
Attending: INTERNAL MEDICINE
Payer: COMMERCIAL

## 2025-04-22 VITALS — WEIGHT: 165 LBS | BODY MASS INDEX: 25.01 KG/M2 | HEIGHT: 68 IN

## 2025-04-22 DIAGNOSIS — R91.8 PULMONARY NODULES: Primary | ICD-10-CM

## 2025-04-22 PROCEDURE — 98007 SYNCH AUDIO-VIDEO EST HI 40: CPT | Performed by: INTERNAL MEDICINE

## 2025-04-22 PROCEDURE — 1126F AMNT PAIN NOTED NONE PRSNT: CPT | Mod: 95 | Performed by: INTERNAL MEDICINE

## 2025-04-22 ASSESSMENT — PAIN SCALES - GENERAL: PAINLEVEL_OUTOF10: NO PAIN (0)

## 2025-04-22 NOTE — PROGRESS NOTES
"Virtual Visit Details    Type of service:  Video Visit   See note        LUNG NODULE & INTERVENTIONAL PULMONARY CLINIC  CL[facility], [facility], [facility] CANCER [address] [address]  Phone: [phone]  Fax: [phone]    Patient:  Guerrero Ibrahim, Age 69 year old, Date of birth 1955, MRN# [id number]  Date of Visit:  04/22/2025  Referring Provider Arian Paredes    Reason for Consultation: Lung Nodule     The patient has been notified of following:   \"This video visit will be conducted via a call between you and your physician/provider. We have found that certain health care needs can be provided without the need for an in-person physical exam.  This service lets us provide the care you need with a video conversation.  If a prescription is necessary we can send it directly to your pharmacy.  If lab work is needed we can place an order for that and you can then stop by our lab to have the test done at a later time.  Video visits are billed at different rates depending on your insurance coverage.  Please reach out to your insurance provider with any questions.  If during the course of the call the physician/provider feels a video visit is not appropriate, you will not be charged for this service.\"  Patient has given verbal consent for Video visit? Yes  How would you like to obtain your AVS? Please refer to rooming staff note  Patient would like the video invitation sent by: Please refer to rooming staff note   Will anyone else be joining your video visit? Please refer to rooming staff note    Video-Visit Details     Type of service:  Video Visit  Video Start Time: 245  Video End Time: 250  Provider Name: Brennon Leonardo MD, MHA   Originating Location (pt. Location): Home  Provider Location: Off campus   Distant Location (provider location): Home/Clinic  Platform used for Video Visit: " AmWell/Doximity    Assessment and Plan:    1. Established multiple pulmonary lung nodule(s) and enodbronchial filling defect. Previously seen nodules and endobronchial filling defects have improved. Some are resolved and some persist. Repeat Ct in 6mo    Billing: I spent a total of 45min spent on date of encounter which includes prep time, visit with the patient and post visit work including documentation and nursing communication     Brennon Leonardo MD, MHA  Associate Professor of Medicine  Section of Interventional Pulmonology   Division of Pulmonary, Allergy, Critical Care and Sleep Medicine   [facility]  Pager: [phone]   Office: [phone]  Email: [email or web address]    Farida Roldan RN   Interventional Pulmonary Care Coordinator   Office: [phone]  Email: [email or web address]     Koko Desai  Interventional Pulmonary Surgery Scheduler   Office: [phone]  Email: [email or web address]         History:     Guerrero Ibrahim is a 69 year old male who is here for consultation regarding Lung Nodule.    - No new resp sx or complaints. Denies dyspnea or cough.   - here for incidental lung nodule finding  - Personal hx of cancer: no  - Family hx of cancer: no  - Exposure hx: Denies asbestos or radon exposure   - Tobacco hx: never smoker   - My interpretation of the images relevant for this visit includes: nodule   - My interpretation of the PFT's relevant for this visit includes: None     Culprit Nodule(s):   The previous multiple endobronchial nodules in the right lung, one solitary right middle lobe endobronchial 3 mm nodule remains with resolution of at least two other endobronchial nodules.  Granulomas disease as well.    Other active medical problems include:   - NA          Past Medical History:    No past medical history on file.          Past Surgical History:      Past Surgical History:   Procedure Laterality Date    APPENDECTOMY  1959    COLONOSCOPY   2003,2008,2013, 2019    COLONOSCOPY N/A 5/23/2024    Procedure: COLONOSCOPY, WITH POLYPECTOMY AND BIOPSY;  Surgeon: Pola Cedeño MD;  Location:  GI    ORTHOPEDIC SURGERY Right     trigger finger x2 in 2000's    SALIVARY GLAND SURGERY      removal 1998    wisdom teeth              Social History:     Social History     Tobacco Use    Smoking status: Never    Smokeless tobacco: Never    Tobacco comments:     never smoked   Substance Use Topics    Alcohol use: Yes     Comment: 1 beer or glass of wine per week            Family History:     Family History   Problem Relation Age of Onset    Hypertension Father     Cerebrovascular Disease Father     Diabetes Sister     Melanoma No family hx of              Allergies:      Allergies   Allergen Reactions    Percocet [Oxycodone-Acetaminophen] Nausea and Vomiting            Medications:     Current Outpatient Medications   Medication Sig Dispense Refill    atorvastatin (LIPITOR) 10 MG tablet Take 1 tablet (10 mg) by mouth daily. 90 tablet 2    finasteride (PROSCAR) 5 MG tablet TAKE 1 TABLET BY MOUTH EVERY DAY 90 tablet 3    methylcellulose (CITRUCEL) powder       tamsulosin (FLOMAX) 0.4 MG capsule TAKE 1 CAPSULE BY MOUTH EVERY DAY 90 capsule 2    vitamin D3 (CHOLECALCIFEROL) 2000 units (50 mcg) tablet Take 1 tablet by mouth daily       No current facility-administered medications for this visit.            Review of Systems:     12-point ROS reviewed and abnormalities stated in the history.         Physical Exam:     Constitutional - looks well, in no apparent distress  Eyes - no redness or discharge  Respiratory -breathing appears comfortable.  No cough.  Skin - No appreciable discoloration or lesions (very limited exam)  Neurological - No apparent tremors. Speech fluent and articlate  Psychiatric - no signs of delirium or anxiety     Exam limited to that easily identified on a virtual visit. The rest of a comprehensive physical examination is deferred due to  PHE (public health emergency) video visit restrictions.         Current Laboratory Data:   All laboratory and imaging data reviewed.          Latest Ref Rng & Units 11/12/2024     1:18 PM   PFT   FVC L 3.75    FEV1 L 3.11    FVC% % 104    FEV1% % 112

## 2025-04-22 NOTE — LETTER
"4/22/2025       RE: Guerrero Ibrahim  [address]  [place] [address]     Dear Colleague,    Thank you for referring your patient, Guerrero Ibrahim, to the [facility] at [facility]. Please see a copy of my visit note below.    Virtual Visit Details    Type of service:  Video Visit   See note        LUNG NODULE & INTERVENTIONAL PULMONARY CLINIC  CLINICS [facility] SUR[facility]ERY [facility]ENTER, [facility], [facility]  [address] [place] [address]  Phone: [phone]  Fax: [phone]    Patient:  Guerrero Ibrahim, Age 69 year old, Date of birth 1955, MRN# [id number]  Date of Visit:  04/22/2025  Referring Provider Arian Paredes    Reason for Consultation: Lung Nodule     The patient has been notified of following:   \"This video visit will be conducted via a call between you and your physician/provider. We have found that certain health care needs can be provided without the need for an in-person physical exam.  This service lets us provide the care you need with a video conversation.  If a prescription is necessary we can send it directly to your pharmacy.  If lab work is needed we can place an order for that and you can then stop by our lab to have the test done at a later time.  Video visits are billed at different rates depending on your insurance coverage.  Please reach out to your insurance provider with any questions.  If during the course of the call the physician/provider feels a video visit is not appropriate, you will not be charged for this service.\"  Patient has given verbal consent for Video visit? Yes  How would you like to obtain your AVS? Please refer to rooming staff note  Patient would like the video invitation sent by: Please refer to rooming staff note   Will anyone else be joining your video visit? Please refer to rooming staff " note    Video-Visit Details     Type of service:  Video Visit  Video Start Time: 245  Video End Time: 250  Provider Name: Brennon Leonardo MD, MHA   Originating Location (pt. Location): Home  Provider Location: Off campus   Distant Location (provider location): Home/Clinic  Platform used for Video Visit: [facility]    Assessment and Plan:    1. Established multiple pulmonary lung nodule(s) and enodbronchial filling defect. Previously seen nodules and endobronchial filling defects have improved. Some are resolved and some persist. Repeat Ct in 6mo    Billing: I spent a total of 45min spent on date of encounter which includes prep time, visit with the patient and post visit work including documentation and nursing communication     Brennon Leonardo MD, MHA  Associate Professor of Medicine  Section of Interventional Pulmonology   Division of Pulmonary, Allergy, Critical Care and Sleep Medicine   [facility]  Pager: [phone]   Office: [phone]  Email: [email or web address]    Farida Roldan RN   Interventional Pulmonary Care Coordinator   Office: [phone]  Email: [email or web address]     Koko Desai  Interventional Pulmonary Surgery Scheduler   Office: [phone]  Email: [email or web address]         History:     Guerrero Ibrahim is a 69 year old male who is here for consultation regarding Lung Nodule.    - No new resp sx or complaints. Denies dyspnea or cough.   - here for incidental lung nodule finding  - Personal hx of cancer: no  - Family hx of cancer: no  - Exposure hx: Denies asbestos or radon exposure   - Tobacco hx: never smoker   - My interpretation of the images relevant for this visit includes: nodule   - My interpretation of the PFT's relevant for this visit includes: None     Culprit Nodule(s):   The previous multiple endobronchial nodules in the right lung, one solitary right middle lobe endobronchial 3 mm nodule remains with resolution of at least two  other endobronchial nodules.  Granulomas disease as well.    Other active medical problems include:   - NA          Past Medical History:    No past medical history on file.          Past Surgical History:      Past Surgical History:   Procedure Laterality Date     APPENDECTOMY  1959     COLONOSCOPY  2003,2008,2013, 2019     COLONOSCOPY N/A 5/23/2024    Procedure: COLONOSCOPY, WITH POLYPECTOMY AND BIOPSY;  Surgeon: Pola Cedeño MD;  Location:  GI     ORTHOPEDIC SURGERY Right     trigger finger x2 in 2000's     SALIVARY GLAND SURGERY      removal 1998     wisdom teeth              Social History:     Social History     Tobacco Use     Smoking status: Never     Smokeless tobacco: Never     Tobacco comments:     never smoked   Substance Use Topics     Alcohol use: Yes     Comment: 1 beer or glass of wine per week            Family History:     Family History   Problem Relation Age of Onset     Hypertension Father      Cerebrovascular Disease Father      Diabetes Sister      Melanoma No family hx of              Allergies:      Allergies   Allergen Reactions     Percocet [Oxycodone-Acetaminophen] Nausea and Vomiting            Medications:     Current Outpatient Medications   Medication Sig Dispense Refill     atorvastatin (LIPITOR) 10 MG tablet Take 1 tablet (10 mg) by mouth daily. 90 tablet 2     finasteride (PROSCAR) 5 MG tablet TAKE 1 TABLET BY MOUTH EVERY DAY 90 tablet 3     methylcellulose (CITRUCEL) powder        tamsulosin (FLOMAX) 0.4 MG capsule TAKE 1 CAPSULE BY MOUTH EVERY DAY 90 capsule 2     vitamin D3 (CHOLECALCIFEROL) 2000 units (50 mcg) tablet Take 1 tablet by mouth daily       No current facility-administered medications for this visit.            Review of Systems:     12-point ROS reviewed and abnormalities stated in the history.         Physical Exam:     Constitutional - looks well, in no apparent distress  Eyes - no redness or discharge  Respiratory -breathing appears comfortable.  No  cough.  Skin - No appreciable discoloration or lesions (very limited exam)  Neurological - No apparent tremors. Speech fluent and articlate  Psychiatric - no signs of delirium or anxiety     Exam limited to that easily identified on a virtual visit. The rest of a comprehensive physical examination is deferred due to PHE (public health emergency) video visit restrictions.         Current Laboratory Data:   All laboratory and imaging data reviewed.          Latest Ref Rng & Units 11/12/2024     1:18 PM   PFT   FVC L 3.75    FEV1 L 3.11    FVC% % 104    FEV1% % 112                        Again, thank you for allowing me to participate in the care of your patient.      Sincerely,    Brennon Leonardo MD

## 2025-04-22 NOTE — NURSING NOTE
Current patient location: [address] [place] [address]    Is the patient currently in the state of [place]? YES    Visit mode: VIDEO    If the visit is dropped, the patient can be reconnected by:VIDEO VISIT: Text to cell phone:   Telephone Information:   Mobile [phone]       Will anyone else be joining the visit? NO  (If patient encounters technical issues they should call [phone])    Are changes needed to the allergy or medication list? Pt stated no changes to allergies and Pt stated no med changes    Are refills needed on medications prescribed by this physician? NO    Rooming Documentation:  Questionnaire(s) completed    Reason for visit: EMELIA DARDEN

## 2025-08-13 ENCOUNTER — OFFICE VISIT (OUTPATIENT)
Dept: INTERNAL MEDICINE | Facility: CLINIC | Age: 70
End: 2025-08-13
Payer: COMMERCIAL

## 2025-08-13 VITALS
TEMPERATURE: 98.4 F | HEART RATE: 67 BPM | DIASTOLIC BLOOD PRESSURE: 83 MMHG | BODY MASS INDEX: 26.39 KG/M2 | HEIGHT: 68 IN | WEIGHT: 174.1 LBS | SYSTOLIC BLOOD PRESSURE: 155 MMHG | OXYGEN SATURATION: 97 % | RESPIRATION RATE: 16 BRPM

## 2025-08-13 DIAGNOSIS — M25.511 RIGHT SHOULDER PAIN, UNSPECIFIED CHRONICITY: Primary | ICD-10-CM

## 2025-08-28 ENCOUNTER — ANCILLARY PROCEDURE (OUTPATIENT)
Dept: MRI IMAGING | Facility: CLINIC | Age: 70
End: 2025-08-28
Attending: INTERNAL MEDICINE
Payer: COMMERCIAL

## 2025-08-28 DIAGNOSIS — M25.511 RIGHT SHOULDER PAIN, UNSPECIFIED CHRONICITY: ICD-10-CM

## 2025-08-28 LAB — RADIOLOGIST FLAGS: ABNORMAL

## 2025-08-28 PROCEDURE — 72141 MRI NECK SPINE W/O DYE: CPT | Performed by: RADIOLOGY

## 2025-09-01 ENCOUNTER — PATIENT OUTREACH (OUTPATIENT)
Dept: CARE COORDINATION | Facility: CLINIC | Age: 70
End: 2025-09-01
Payer: COMMERCIAL

## 2025-09-04 ENCOUNTER — VIRTUAL VISIT (OUTPATIENT)
Dept: INTERNAL MEDICINE | Facility: CLINIC | Age: 70
End: 2025-09-04
Payer: COMMERCIAL

## 2025-09-04 DIAGNOSIS — M25.519 SHOULDER PAIN, UNSPECIFIED CHRONICITY, UNSPECIFIED LATERALITY: Primary | ICD-10-CM

## 2025-09-16 ENCOUNTER — PRE VISIT (OUTPATIENT)
Dept: ORTHOPEDICS | Facility: CLINIC | Age: 70
End: 2025-09-16

## (undated) RX ORDER — FENTANYL CITRATE 50 UG/ML
INJECTION, SOLUTION INTRAMUSCULAR; INTRAVENOUS
Status: DISPENSED
Start: 2024-05-23